# Patient Record
Sex: MALE | Race: WHITE | Employment: OTHER | URBAN - METROPOLITAN AREA
[De-identification: names, ages, dates, MRNs, and addresses within clinical notes are randomized per-mention and may not be internally consistent; named-entity substitution may affect disease eponyms.]

---

## 2018-03-07 ENCOUNTER — HOSPITAL ENCOUNTER (OUTPATIENT)
Dept: LAB | Age: 73
Discharge: HOME | End: 2018-03-07
Payer: MEDICARE

## 2018-03-07 ENCOUNTER — OFFICE VISIT (OUTPATIENT)
Dept: INTERNAL MEDICINE CLINIC | Age: 73
End: 2018-03-07

## 2018-03-07 ENCOUNTER — TELEPHONE (OUTPATIENT)
Dept: INTERNAL MEDICINE CLINIC | Age: 73
End: 2018-03-07

## 2018-03-07 VITALS
WEIGHT: 193.4 LBS | BODY MASS INDEX: 22.83 KG/M2 | HEIGHT: 77 IN | RESPIRATION RATE: 16 BRPM | OXYGEN SATURATION: 97 % | SYSTOLIC BLOOD PRESSURE: 142 MMHG | TEMPERATURE: 98.7 F | DIASTOLIC BLOOD PRESSURE: 90 MMHG | HEART RATE: 77 BPM

## 2018-03-07 DIAGNOSIS — Z13.39 SCREENING FOR ALCOHOLISM: ICD-10-CM

## 2018-03-07 DIAGNOSIS — Z12.11 COLON CANCER SCREENING: Chronic | ICD-10-CM

## 2018-03-07 DIAGNOSIS — Z12.5 PROSTATE CANCER SCREENING: ICD-10-CM

## 2018-03-07 DIAGNOSIS — N52.9 ERECTILE DYSFUNCTION, UNSPECIFIED ERECTILE DYSFUNCTION TYPE: ICD-10-CM

## 2018-03-07 DIAGNOSIS — K64.9 HEMORRHOIDS, UNSPECIFIED HEMORRHOID TYPE: ICD-10-CM

## 2018-03-07 DIAGNOSIS — Z13.31 SCREENING FOR DEPRESSION: ICD-10-CM

## 2018-03-07 DIAGNOSIS — E78.2 MIXED HYPERLIPIDEMIA: ICD-10-CM

## 2018-03-07 DIAGNOSIS — Z79.899 ENCOUNTER FOR LONG-TERM (CURRENT) USE OF MEDICATIONS: ICD-10-CM

## 2018-03-07 DIAGNOSIS — Z00.00 INITIAL MEDICARE ANNUAL WELLNESS VISIT: Primary | ICD-10-CM

## 2018-03-07 PROCEDURE — 81003 URINALYSIS AUTO W/O SCOPE: CPT

## 2018-03-07 PROCEDURE — 36415 COLL VENOUS BLD VENIPUNCTURE: CPT

## 2018-03-07 PROCEDURE — 80061 LIPID PANEL: CPT

## 2018-03-07 PROCEDURE — 84153 ASSAY OF PSA TOTAL: CPT

## 2018-03-07 PROCEDURE — 85025 COMPLETE CBC W/AUTO DIFF WBC: CPT

## 2018-03-07 PROCEDURE — 80053 COMPREHEN METABOLIC PANEL: CPT

## 2018-03-07 NOTE — MR AVS SNAPSHOT
7 [facility], [address] [place] [address] 
[phone] Patient: Whit Dotsno MRN: [id number] PMY:[birth year] Visit Information Date & Time Provider Department Dept. Phone Encounter #  
 3/7/2018 11:20 AM Le Solis MD [place] [id number] [facility] [id number] Follow-up Instructions Return in about 1 year (around 3/7/2019). Upcoming Health Maintenance Date Due  
 GLAUCOMA SCREENING Q2Y 5/1/2018* Pneumococcal 65+ Low/Medium Risk (2 of 2 - PPSV23) 5/1/2018* DTaP/Tdap/Td series (1 - Tdap) 6/1/2018* MEDICARE YEARLY EXAM 3/8/2019 COLONOSCOPY 3/7/2028 *Topic was postponed. The date shown is not the original due date. Allergies as of 3/7/2018  Review Complete On: 3/7/2018 By: Le Solis MD  
  
 Severity Noted Reaction Type Reactions Amoxicillin High 12/15/2010    Hives Other Medication  02/15/2013    Other (comments) PT HAD HIVES FOR 10 MONTHS AFTER ING HR WITH MESH; PT SUSPECTS MESH; HIVES MOSTLY ON ABDOMEN BUT ON LIMPBS & FACE AT TIMES. Current Immunizations  Reviewed on 3/7/2018 Name Date Influenza High Dose Vaccine PF 12/15/2017, 11/29/2016, 1/5/2016 Influenza Vaccine 10/30/2014 Influenza Vaccine Whole 10/7/2010 ZZZ-RETIRED (DO NOT USE) Pneumococcal Vaccine (Unspecified Type) 1/1/2009 Zoster Vaccine, Live 4/20/2013 Reviewed by Le Solis MD on 3/7/2018 at 11:37 AM  
 Reviewed by Le Solis MD on 3/7/2018 at 11:37 AM  
 Reviewed by Le Solis MD on 3/7/2018 at 11:43 AM  
 Reviewed by Le Solis MD on 3/7/2018 at 11:48 AM  
 Reviewed by Le Solis MD on 3/7/2018 at 11:48 AM  
 Reviewed by Le Solis MD on 3/7/2018 at 11:51 AM  
You Were Diagnosed With   
  
 Codes Comments Erectile dysfunction, unspecified erectile dysfunction type    -  Primary ICD-10-CM: N52.9 ICD-9-CM: 607.84 Encounter for long-term (current) use of medications     ICD-10-CM: Z79.899 ICD-9-CM: V58.69 Colon cancer screening     ICD-10-CM: Z12.11 ICD-9-CM: V76.51 Initial Medicare annual wellness visit     ICD-10-CM: Z00.00 ICD-9-CM: V70.0 Screening for alcoholism     ICD-10-CM: Z13.89 ICD-9-CM: V79.1 Screening for depression     ICD-10-CM: Z13.89 ICD-9-CM: V79.0 Vitals BP Pulse Temp Resp Height(growth percentile) Weight(growth percentile) 142/90 (BP 1 Location: Left arm, BP Patient Position: Sitting) 77 98.7 °F (37.1 °C) (Oral) 16 6' 5\" (1.956 m) 193 lb 6.4 oz (87.7 kg) SpO2 BMI Smoking Status 97% 22.93 kg/m2 Never Smoker BMI and BSA Data Body Mass Index Body Surface Area  
 22.93 kg/m 2 2.18 m 2 Preferred Pharmacy Pharmacy Name Phone [place] [address] [address] [facility] [phone] Your Updated Medication List  
  
   
This list is accurate as of 3/7/18 12:12 PM.  Always use your most recent med list.  
  
  
  
  
 DESOXIMETASONE  
by Does Not Apply route. diph,pertuss(acel),tetanus vac(PF) 2 Lf-(2.5-5-3-5 mcg)-5Lf/0.5 mL Syrg vaccine Commonly known as:  ADACEL(TDAP ADOLESN/ADULT)(PF)  
0.5 mL by IntraMUSCular route once for 1 dose. pneumococcal 13 paco conj dip 0.5 mL Syrg injection Commonly known as:  PREVNAR-13  
0.5 mL by IntraMUSCular route once for 1 dose. sildenafil (antihypertensive) 20 mg tablet Commonly known as:  REVATIO Take one tablet daily as needed  
  
 triamcinolone acetonide 0.025 % topical cream  
Commonly known as:  KENALOG Apply  to affected area two (2) times a day. use thin layer  
  
 varicella-zoster recombinant (PF) 50 mcg/0.5 mL Susr injection Commonly known as:  SHINGRIX  
0.5 mL by IntraMUSCular route once for 1 dose. Repeat in 2-4 months Prescriptions Printed Refills  diph,pertuss,acel,,tetanus vac,PF, (ADACEL,TDAP ADOLESN/ADULT,,PF,) 2 Lf-(2.5-5-3-5 mcg)-5Lf/0.5 mL syrg vaccine 0  
 Si.5 mL by IntraMUSCular route once for 1 dose. Class: Print Route: IntraMUSCular  
 pneumococcal 13 paco conj dip (PREVNAR-13) 0.5 mL syrg injection 0 Si.5 mL by IntraMUSCular route once for 1 dose. Class: Print Route: IntraMUSCular  
 varicella-zoster recombinant, PF, (SHINGRIX) 50 mcg/0.5 mL susr injection 1 Si.5 mL by IntraMUSCular route once for 1 dose. Repeat in 2-4 months Class: Print Route: IntraMUSCular We Performed the Following [address] [[id number] [facility]] DE ANNUAL ALCOHOL SCREEN 15 MIN [id number] [facility]] Follow-up Instructions Return in about 1 year (around 3/7/2019). Patient Instructions Medicare Wellness Visit, Male The best way to live healthy is to have a healthy lifestyle by eating a well-balanced diet, exercising regularly, limiting alcohol and stopping smoking. Regular physical exams and screening tests are another way to keep healthy. Preventive exams provided by your health care provider can find health problems before they become diseases or illnesses. Preventive services including immunizations, screening tests, monitoring and exams can help you take care of your own health. All people over age 72 should have a pneumovax  and and a prevnar shot to prevent pneumonia. These are once in a lifetime unless you and your provider decide differently. All people over 65 should have a yearly flu shot and a tetanus vaccine every 10 years. Screening for diabetes mellitus with a blood sugar test should be done every year. Glaucoma is a disease of the eye due to increased ocular pressure that can lead to blindness and it should be done every year by an eye professional. 
 
Cardiovascular screening tests that check for elevated lipids (fatty part of blood) which can lead to heart disease and strokes should be done every 5 years.  
 
Colorectal screening that evaluates for blood or polyps in your colon should be done yearly as a stool test or every five years as a flexible sigmoidoscope or every 10 years as a colonoscopy up to age 76. Men up to age 76 may need a screening blood test for prostate cancer at certain intervals, depending on their personal and family history. This decision is between the patient and his provider. If you have been a smoker or had family history of abdominal aortic aneurysms, you and your provider may decide to schedule an ultrasound test of your aorta. Hepatitis C screening is also recommended for anyone born between [birth year] through [address]. A shingles vaccine is also recommended once in a lifetime after age 61. Your Medicare Wellness Exam is recommended annually. Here is a list of your current Health Maintenance items with a due date: There are no preventive care reminders to display for this patient. Introducing [facility]! Dear Citlaly Cramer: Thank you for requesting a [employer] account. Our records indicate that you already have an active Dat[employer] account. You can access your account anytime at [email or web address] Did you know that you can access your hospital and ER discharge instructions at any time in Dat[employer]? You can also review all of your test results from your hospital stay or ER visit. Additional Information If you have questions, please visit the Frequently Asked Questions section of the Dat[employer] website at [email or web address] Remember, [employer] is NOT to be used for urgent needs. For medical emergencies, dial 911. Now available from your iPhone and Android! Please provide this summary of care documentation to your next provider. Your primary care clinician is listed as Re Cheung. If you have any questions after today's visit, please call [phone].

## 2018-03-07 NOTE — TELEPHONE ENCOUNTER
Fax sent to [place] [phone] requesting immunization/vaccine record, including pneumonia. Confirmation received and scanned into chart.

## 2018-03-07 NOTE — TELEPHONE ENCOUNTER
----- Message from Rand Hwang MD sent at 3/7/2018 11:54 AM EST -----  Please get his immunization record from the [address] [place] [address] for pneumonia vaccines.      Rand Hwang MD

## 2018-03-07 NOTE — PROGRESS NOTES
Subjective:      Bart Obrien is a 74 y.o. male who presents today for refills of his viagra. He has not been seen since 2016. No new concerns. He declines to have a screening colonoscopy done  He is due for his eye exam at this time. He is due for several immunizations. He also has concerns about a recurrent hemorrhoid. He had a little bit of blood with wiping this morning. No pain and no recurrence. Patient Active Problem List   Diagnosis Code    Inguinal hernia K40.90    Colon cancer screening Z12.11    Hemorrhoids K64.9    Eczema L30.9    Vitiligo L80     Current Outpatient Prescriptions   Medication Sig Dispense Refill    DESOXIMETASONE by Does Not Apply route.  sildenafil (REVATIO) 20 mg tablet Take one tablet daily as needed 30 Tab 3    triamcinolone acetonide (KENALOG) 0.025 % topical cream Apply  to affected area two (2) times a day. use thin layer 15 g 0        Review of Systems    Pertinent items are noted in HPI. Objective:     Visit Vitals    /90 (BP 1 Location: Left arm, BP Patient Position: Sitting)    Pulse 77    Temp 98.7 °F (37.1 °C) (Oral)    Resp 16    Ht 6' 5\" (1.956 m)    Wt 193 lb 6.4 oz (87.7 kg)    SpO2 97%    BMI 22.93 kg/m2     General appearance: alert, cooperative, no distress, appears stated age  Head: Normocephalic, without obvious abnormality, atraumatic  Neck: supple, symmetrical, trachea midline, no adenopathy, no carotid bruit and no JVD  Lungs: clear to auscultation bilaterally  Heart: regular rate and rhythm, S1, S2 normal, no murmur, click, rub or gallop  Abdomen: soft, non-tender. Bowel sounds normal. No masses,  no organomegaly  Rectal: external hemorrhoids noted. No full nor bleeding  Extremities: extremities normal, atraumatic, no cyanosis or edema  Pulses: 2+ and symmetric    Assessment/Plan:     1.  Erectile dysfunction, unspecified erectile dysfunction type  -will check labs, including PSA prior to refilling his medications    2. Encounter for long-term (current) use of medications      3. Colon cancer screening  -patient states aware of importance of a screening colonoscopy . He continues to refuse testing. 4. Initial Medicare annual wellness visit  -done today  -scripts for tdap, prevnar and also shingrix given to patient. - Annual  Alcohol Screen 15 min ()  - Depression Screen Annual    5. Screening for alcoholism    - Annual  Alcohol Screen 15 min ()    6. Screening for depression    - Depression Screen Annual    7. Prostate cancer screening    - PSA SCREENING (SCREENING)    8. Mixed hyperlipidemia  -diet controlled. - CBC WITH AUTOMATED DIFF  - METABOLIC PANEL, COMPREHENSIVE  - LIPID PANEL  - URINALYSIS W/ RFLX MICROSCOPIC    9. Hemorrhoids, unspecified hemorrhoid type  -continue to monitor     Follow-up Disposition:     Follow up yearly     Return if symptoms worsen or fail to improve. Advised patient to call back or return to office if symptoms worsen/change/persist.     Discussed expected course/resolution/complications of diagnosis in detail with patient. Medication risks/benefits/costs/interactions/alternatives discussed with patient. Patient was given an after visit summary which includes diagnoses, current medications, & vitals. Patient expressed understanding with the diagnosis and plan. This is an Initial Medicare Annual Wellness Exam (AWV) (Performed 12 months after IPPE or effective date of Medicare Part B enrollment, Once in a lifetime)    I have reviewed the patient's medical history in detail and updated the computerized patient record.      History     Past Medical History:   Diagnosis Date    Hearing reduced     Hemorrhoids     Ill-defined condition     IDIOPATHIC HIVES    Recurrent left inguinal hernia 2/10/2013    repaired 2/21/13    Recurrent right inguinal hernia 8/20/2014    Snoring     Stroke ([facility] [address] 10/2014    POSSIBLE TIA; MRI DONE TODAY;    Vertigo  Vitiligo       Past Surgical History:   Procedure Laterality Date    HX CHOLECYSTECTOMY  june 1982     Dr. Lokesh Mak HX HEMORRHOIDECTOMY      HX HERNIA REPAIR  11-    Dr. Andrew Felipe    HX HERNIA REPAIR  12-28-10    Left inguinal indirect hernia and lipoma of cord-Dr. Joesph Castaneda    HX HERNIA REPAIR  2-21-13    repair of recurrent left inguinal nktsax-DKG-OrDr. Vladimir Clark    HX HERNIA REPAIR  12-30-14    Excision of suture granuloma right inguinal region and repair of recurrent direct inguinal hernia with P&P-[facility]-DR. Vladimir Clark    HX ORTHOPAEDIC  1955    COMPUND FX LEFT FOREARM W/PIN AND SCREW    HX OTHER SURGICAL      VASECTOMY    REMOVAL OF SPERM CORD LESION  12-28-10    Dr. Amaya Breezjustyn HERNIA,5+Y/O,REDUCIBL  12-    Dr. Peter Connelly by Does Not Apply route.  sildenafil (REVATIO) 20 mg tablet Take one tablet daily as needed 30 Tab 3    triamcinolone acetonide (KENALOG) 0.025 % topical cream Apply  to affected area two (2) times a day. use thin layer 15 g 0     Allergies   Allergen Reactions    Amoxicillin Hives    Other Medication Other (comments)     PT HAD HIVES FOR 10 MONTHS AFTER ING HR WITH MESH; PT SUSPECTS MESH; HIVES MOSTLY ON ABDOMEN BUT ON LIMPBS & FACE AT TIMES.        Family History   Problem Relation Age of Onset   [facility] Stroke Mother      aneurysm - hemorrhagic stroke    Hypertension Father     Stroke Father      started age [de-identified]    Hypertension Sister     Cancer Sister      Bone    Cancer Daughter      MELANOMA    Anesth Problems Neg Hx      Social History   Substance Use Topics    Smoking status: Never Smoker    Smokeless tobacco: Never Used    Alcohol use Yes      Comment: RARELY     Patient Active Problem List   Diagnosis Code    Inguinal hernia K40.90    Colon cancer screening Z12.11    Hemorrhoids K64.9    Eczema L30.9    Vitiligo L80       Depression Risk Factor Screening:     PHQ over the last two weeks 3/7/2018   Little interest or pleasure in doing things Not at all   Feeling down, depressed or hopeless Not at all   Total Score PHQ 2 0     Alcohol Risk Factor Screening: You do not drink alcohol or very rarely. Functional Ability and Level of Safety:     Hearing Loss  Hearing is good. The patient wears hearing aids. Activities of Daily Living  The home contains: no safety equipment. Patient does total self care    Fall Risk  Fall Risk Assessment, last 12 mths 3/7/2018   Able to walk? Yes   Fall in past 12 months? Yes   Fall with injury? No   Number of falls in past 12 months 1   Fall Risk Score 1       Abuse Screen  Patient is not abused    Cognitive Screening   Evaluation of Cognitive Function:  Has your family/caregiver stated any concerns about your memory: no  Normal    Patient Care Team   Patient Care Team:  Rand Hwang MD as PCP - General (Internal Medicine)    Assessment/Plan   Education and counseling provided:  Are appropriate based on today's review and evaluation    Diagnoses and all orders for this visit:    1. Erectile dysfunction, unspecified erectile dysfunction type    2. Encounter for long-term (current) use of medications    3. Colon cancer screening    4. Initial Medicare annual wellness visit  -     Annual  Alcohol Screen 15 min ()  -     Depression Screen Annual    5. Screening for alcoholism  -     Annual  Alcohol Screen 15 min ()    6. Screening for depression  -     Depression Screen Annual         There are no preventive care reminders to display for this patient.

## 2018-03-07 NOTE — PROGRESS NOTES
Yolanda Marks is a 74 y.o. male      Chief Complaint   Patient presents with    Medication Evaluation     states want Sildenafil Citrate    Immunization/Injection     wants a Tentanus shot because a dog scratch has right arm about 2 weeks ago.  Anal Irritation     states wants his rectum check because he's having pain in that area     1. Have you been to the ER, urgent care clinic since your last visit? Hospitalized since your last visit? No    2. Have you seen or consulted any other health care providers outside of the [address] [place] [address] since your last visit? Include any pap smears or colon screening.  Yes, went to see Dr. Jose Krause for skin lesion and frozen off (derm)

## 2018-03-08 DIAGNOSIS — N52.9 ERECTILE DYSFUNCTION, UNSPECIFIED ERECTILE DYSFUNCTION TYPE: ICD-10-CM

## 2018-03-08 LAB
ALBUMIN SERPL-MCNC: 4.1 G/DL (ref 3.5–4.8)
ALBUMIN/GLOB SERPL: 1.5 {RATIO} (ref 1.2–2.2)
ALP SERPL-CCNC: 77 IU/L (ref 39–117)
ALT SERPL-CCNC: 17 IU/L (ref 0–44)
APPEARANCE UR: CLEAR
AST SERPL-CCNC: 23 IU/L (ref 0–40)
BASOPHILS # BLD AUTO: 0 X10E3/UL (ref 0–0.2)
BASOPHILS NFR BLD AUTO: 1 %
BILIRUB SERPL-MCNC: 0.6 MG/DL (ref 0–1.2)
BILIRUB UR QL STRIP: NEGATIVE
BUN SERPL-MCNC: 15 MG/DL (ref 8–27)
BUN/CREAT SERPL: 14 (ref 10–24)
CALCIUM SERPL-MCNC: 9.6 MG/DL (ref 8.6–10.2)
CHLORIDE SERPL-SCNC: 97 MMOL/L (ref 96–106)
CHOLEST SERPL-MCNC: 199 MG/DL (ref 100–199)
CO2 SERPL-SCNC: 27 MMOL/L (ref 18–29)
COLOR UR: YELLOW
CREAT SERPL-MCNC: 1.08 MG/DL (ref 0.76–1.27)
EOSINOPHIL # BLD AUTO: 0.2 X10E3/UL (ref 0–0.4)
EOSINOPHIL NFR BLD AUTO: 4 %
ERYTHROCYTE [DISTWIDTH] IN BLOOD BY AUTOMATED COUNT: 13.1 % (ref 12.3–15.4)
GFR SERPLBLD CREATININE-BSD FMLA CKD-EPI: 67 ML/MIN/1.73
GFR SERPLBLD CREATININE-BSD FMLA CKD-EPI: 78 ML/MIN/1.73
GLOBULIN SER CALC-MCNC: 2.8 G/DL (ref 1.5–4.5)
GLUCOSE SERPL-MCNC: 73 MG/DL (ref 65–99)
GLUCOSE UR QL: NEGATIVE
HCT VFR BLD AUTO: 46.5 % (ref 37.5–51)
HDLC SERPL-MCNC: 58 MG/DL
HGB BLD-MCNC: 15.8 G/DL (ref 13–17.7)
HGB UR QL STRIP: NEGATIVE
IMM GRANULOCYTES # BLD: 0 X10E3/UL (ref 0–0.1)
IMM GRANULOCYTES NFR BLD: 0 %
INTERPRETATION, 910389: NORMAL
KETONES UR QL STRIP: NEGATIVE
LDLC SERPL CALC-MCNC: 117 MG/DL (ref 0–99)
LEUKOCYTE ESTERASE UR QL STRIP: NEGATIVE
LYMPHOCYTES # BLD AUTO: 1.6 X10E3/UL (ref 0.7–3.1)
LYMPHOCYTES NFR BLD AUTO: 30 %
MCH RBC QN AUTO: 32 PG (ref 26.6–33)
MCHC RBC AUTO-ENTMCNC: 34 G/DL (ref 31.5–35.7)
MCV RBC AUTO: 94 FL (ref 79–97)
MICRO URNS: NORMAL
MONOCYTES # BLD AUTO: 0.6 X10E3/UL (ref 0.1–0.9)
MONOCYTES NFR BLD AUTO: 10 %
NEUTROPHILS # BLD AUTO: 3 X10E3/UL (ref 1.4–7)
NEUTROPHILS NFR BLD AUTO: 55 %
NITRITE UR QL STRIP: NEGATIVE
PH UR STRIP: 5 [PH] (ref 5–7.5)
PLATELET # BLD AUTO: 238 X10E3/UL (ref 150–379)
POTASSIUM SERPL-SCNC: 5 MMOL/L (ref 3.5–5.2)
PROT SERPL-MCNC: 6.9 G/DL (ref 6–8.5)
PROT UR QL STRIP: NEGATIVE
PSA SERPL-MCNC: 1.2 NG/ML (ref 0–4)
RBC # BLD AUTO: 4.93 X10E6/UL (ref 4.14–5.8)
SODIUM SERPL-SCNC: 140 MMOL/L (ref 134–144)
SP GR UR: 1.01 (ref 1–1.03)
TRIGL SERPL-MCNC: 119 MG/DL (ref 0–149)
UROBILINOGEN UR STRIP-MCNC: 0.2 MG/DL (ref 0.2–1)
VLDLC SERPL CALC-MCNC: 24 MG/DL (ref 5–40)
WBC # BLD AUTO: 5.5 X10E3/UL (ref 3.4–10.8)

## 2018-03-08 RX ORDER — SILDENAFIL CITRATE 20 MG/1
TABLET ORAL
Qty: 30 TAB | Refills: 3 | Status: SHIPPED | OUTPATIENT
Start: 2018-03-08 | End: 2019-04-01 | Stop reason: SDUPTHER

## 2018-03-08 NOTE — TELEPHONE ENCOUNTER
Received fax from [facility] regarding patient. Note that there are \"NO RECORDS\" for this patient.   Forwarded to Dr. Margaux Amin.

## 2018-08-31 ENCOUNTER — TELEPHONE (OUTPATIENT)
Dept: INTERNAL MEDICINE CLINIC | Age: 73
End: 2018-08-31

## 2018-09-04 ENCOUNTER — DOCUMENTATION ONLY (OUTPATIENT)
Dept: INTERNAL MEDICINE CLINIC | Age: 73
End: 2018-09-04

## 2018-09-04 NOTE — PROGRESS NOTES
Received fax from [facility] with documentation of administration of immunizations, including: Shingrix. Chart updated to reflect, and placed to be scanned into chart.

## 2018-09-26 ENCOUNTER — OFFICE VISIT (OUTPATIENT)
Dept: INTERNAL MEDICINE CLINIC | Age: 73
End: 2018-09-26

## 2018-09-26 VITALS
TEMPERATURE: 96.4 F | SYSTOLIC BLOOD PRESSURE: 130 MMHG | HEART RATE: 88 BPM | WEIGHT: 193 LBS | HEIGHT: 77 IN | RESPIRATION RATE: 18 BRPM | OXYGEN SATURATION: 94 % | DIASTOLIC BLOOD PRESSURE: 88 MMHG | BODY MASS INDEX: 22.79 KG/M2

## 2018-09-26 DIAGNOSIS — Z78.9 RECENT FOREIGN TRAVEL: ICD-10-CM

## 2018-09-26 DIAGNOSIS — R22.0 FACIAL SWELLING: Primary | ICD-10-CM

## 2018-09-26 DIAGNOSIS — R05.9 COUGH: ICD-10-CM

## 2018-09-26 RX ORDER — AZITHROMYCIN 250 MG/1
250 TABLET, FILM COATED ORAL SEE ADMIN INSTRUCTIONS
Qty: 6 TAB | Refills: 0 | Status: SHIPPED | OUTPATIENT
Start: 2018-09-26 | End: 2018-10-01

## 2018-09-26 RX ORDER — BETAMETHASONE DIPROPIONATE 0.5 MG/G
CREAM TOPICAL
COMMUNITY
Start: 2018-09-14 | End: 2019-04-03

## 2018-09-26 NOTE — PATIENT INSTRUCTIONS
1. Mucinex (Guaifenesen) plain-Blue Box 600 mg. Take one twice daily with full glass water   Take for 10 days    2. Saline Nasal Spray - use liberally to flush post-nasal area; use as many times a day as desired. Keep spraying with head tilted back until you feel need to swallow    3. Drink lots of fluids (mainly water) to keep mucus thinner    4. If needed, for cough, we recommend Delsym cough syrup    5. Long acting antihistamine (Allegra/Fexofenadine or Zyrtec/Ceterizine) is useful if allergy symptoms are also present    6. Decongestants should not be used they actually contribute to overdrying/thickening of the mucus, and can raise the BP and overstimulate the heart    7.  Cool compress to facial swelling as needed for 20 minutes per application

## 2018-09-26 NOTE — PROGRESS NOTES
Reviewed record in preparation for visit and have obtained necessary documentation. Identified pt with two pt identifiers(name and ). Health Maintenance Due   Topic    DTaP/Tdap/Td series (1 - Tdap)    Pneumococcal 65+ Low/Medium Risk (2 of 2 - PPSV23)    GLAUCOMA SCREENING Q2Y     Influenza Age 5 to Adult          Chief Complaint   Patient presents with    Allergic Reaction     Patient stated it stated with runny nose coming from Vac[place] on plane on Monday. Tuesday he noted lips stated swelling  after lunch.  Cold Symptoms     Patient stated no pain or itching to lips. Patient stated took Bendryl but it has not help also noted coughing but no coughing today. Wt Readings from Last 3 Encounters:   18 193 lb (87.5 kg)   18 193 lb 6.4 oz (87.7 kg)   16 187 lb 8 oz (85 kg)     Temp Readings from Last 3 Encounters:   18 98.7 °F (37.1 °C) (Oral)   16 98 °F (36.7 °C) (Oral)   04/08/15 98.6 °F (37 °C) (Oral)     BP Readings from Last 3 Encounters:   18 142/90   16 130/80   04/08/15 130/82     Pulse Readings from Last 3 Encounters:   18 77   16 93   04/08/15 90           Learning Assessment:  :     Learning Assessment 2014   PRIMARY LEARNER Patient   HIGHEST LEVEL OF EDUCATION - PRIMARY LEARNER  > 4 YEARS OF COLLEGE   BARRIERS PRIMARY LEARNER NONE   CO-LEARNER CAREGIVER No   PRIMARY LANGUAGE ENGLISH    NEED No   LEARNER PREFERENCE PRIMARY READING     DEMONSTRATION   LEARNING SPECIAL TOPICS no   ANSWERED BY patient   RELATIONSHIP SELF       Depression Screening:  :     PHQ over the last two weeks 2018   Little interest or pleasure in doing things Not at all   Feeling down, depressed, irritable, or hopeless Not at all   Total Score PHQ 2 0       Fall Risk Assessment:  :     Fall Risk Assessment, last 12 mths 2018   Able to walk? Yes   Fall in past 12 months?  No   Fall with injury? -   Number of falls in past 12 months - Fall Risk Score -       Abuse Screening:  :     Abuse Screening Questionnaire 3/7/2018 7/1/2014   Do you ever feel afraid of your partner? N N   Are you in a relationship with someone who physically or mentally threatens you? N N   Is it safe for you to go home? Y Y       Coordination of Care Questionnaire:  :     1) Have you been to an emergency room, urgent care clinic since your last visit? no   Hospitalized since your last visit? no             2) Have you seen or consulted any other health care providers outside of [facility] since your last visit? yes Dr. Tran Last  8/11/2018(Include any pap smears or colon screenings in this section.)    3) Do you have an Advance Directive on file? yes    4) Are you interested in receiving information on Advance Directives? NO      Patient is accompanied by self I have received verbal consent from Neo Chamberlain to discuss any/all medical information while they are present in the room.

## 2018-09-26 NOTE — PROGRESS NOTES
75 yo male arrived back from [facility] late on PM of 9/23. His nose dripped all the way back, and now cough. He took an OTC \"plus\" cold med, and was able to sleep. Yesterday AM, he had a swollen area on upper R lip was was \"hard\", and he developed a cough. He has had hives in the past, but they always start with itching followed by hives. He has found this process can be stopped with oral Benedryl, so he he has taken several doses of this, but as the day went on the swelling spreac across across the entire upper lip and that area of the face. There has been no tongue swelling or shortness of breath. He has had no hx of food allergies. He had his second Shingrix shot on 9/30. He sees a Dermatologist for [place] my itchy bumps\", and a very expensive medication was prescribed, but it is too expensive for him, so he decided not to go that direction. None of the topical agents he has used have helped. PE: WNWD WM   T - 96.4   Face - upper lip and area between lip and nose is visibly swollen; mild skin redness upper R lip and skin above   Heart - 88/M   Lungs - clear    Imp: Facial swelling - possible reaction to OTC med   Recent Foreign Travel   Cough    Plan: (discussed with Dr. Kymberly Gonzalez)   Z-pack   Hydration   Mucinex  ________________________  Expected course of current diagnosed problem(s) as well as expected progression and possible complications, and desired follow up with provider are discussed with patient. Patient is encouraged to be back in touch with any questions or concerns. Patient expresses understanding of plan of care. Patient is given AVS which includes diagnoses, current medications, vitals.

## 2018-09-26 NOTE — MR AVS SNAPSHOT
[address] [facility], [address] [place] [address] 
[phone] Patient: Sydney Adamson MRN: [id number] ADC:[id number][birth year][id number] Visit Information Date & Time Provider Department Dept. Phone Encounter #  
 9/26/2018  9:40 [facility] [phone] [id number] Upcoming Health Maintenance Date Due DTaP/Tdap/Td series (1 - Tdap) 6[birth year]5/1966 Pneumococcal 65+ Low/Medium Risk (2 of 2 - PPSV23) 1/1/2014 GLAUCOMA SCREENING Q2Y 1/1/2018 Influenza Age 5 to Adult 8/1/2018 Shingrix Vaccine Age 50> (2 of 2) 10/25/2018 MEDICARE YEARLY EXAM 3/8/2019 COLONOSCOPY 3/7/2028 Allergies as of 9/26/2018  Review Complete On: 9/26/2018 By: Yissel Emerson NP Severity Noted Reaction Type Reactions Amoxicillin High 12/15/2010    Hives Other Medication  02/15/2013    Other (comments) PT HAD HIVES FOR 10 MONTHS AFTER ING HR WITH MESH; PT SUSPECTS MESH; HIVES MOSTLY ON ABDOMEN BUT ON LIMPBS & FACE AT TIMES. Current Immunizations  Reviewed on 3/7/2018 Name Date Influenza High Dose Vaccine PF 12/15/2017, 11/29/2016, 1/5/2016 Influenza Vaccine 10/30/2014 Influenza Vaccine Whole 10/7/2010 ZZZ-RETIRED (DO NOT USE) Pneumococcal Vaccine (Unspecified Type) 1/1/2009 Zoster Recombinant 8/30/2018 Zoster Vaccine, Live 4/20/2013 Not reviewed this visit You Were Diagnosed With   
  
 Codes Comments Facial swelling    -  Primary ICD-10-CM: R22.0 ICD-9-CM: 784.2 Cough     ICD-10-CM: R05 ICD-9-CM: 786.2 Recent foreign travel     ICD-10-CM: Z78.9 ICD-9-CM: V49.89 Vitals BP Pulse Temp Resp Height(growth percentile) Weight(growth percentile) 130/88 (BP 1 Location: Left arm, BP Patient Position: Sitting) 88 96.4 °F (35.8 °C) (Oral) 18 6' 5\" (1.956 m) 193 lb (87.5 kg) SpO2 BMI Smoking Status 94% 22.89 kg/m2 Never Smoker Vitals History BMI and BSA Data Body Mass Index Body Surface Area  
 22.89 kg/m 2 2.18 m 2 Preferred Pharmacy Pharmacy Name Phone [facility] [address] [place] [address] W Dr. Hillary Han [facility] [phone] Your Updated Medication List  
  
   
This list is accurate as of 9/26/18 10:58 AM.  Always use your most recent med list.  
  
  
  
  
 augmented betamethasone dipropionate 0.05 % topical cream  
Commonly known as:  DIPROLENE-AF  
  
 azithromycin 250 mg tablet Commonly known as:  [place] Jazmine Take 1 Tab by mouth See Admin Instructions for 5 days. DESOXIMETASONE  
by Does Not Apply route. sildenafil (antihypertensive) 20 mg tablet Commonly known as:  REVATIO  
3 tab daily 20 minutes prior to anticipated need. triamcinolone acetonide 0.025 % topical cream  
Commonly known as:  KENALOG Apply  to affected area two (2) times a day. use thin layer Prescriptions Sent to Pharmacy Refills  
 azithromycin (ZITHROMAX) 250 mg tablet 0 Sig: Take 1 Tab by mouth See Admin Instructions for 5 days. Class: Normal  
 Pharmacy: [facility] [address] [place] [address] W Dr. Hillary Han [facility] Ph #: [phone] Route: Oral  
  
Patient Instructions 1. Mucinex (Guaifenesen) plain-Blue Box 600 mg. Take one twice daily with full glass water Take for 10 days 2. Saline Nasal Spray - use liberally to flush post-nasal area; use as many times a day as desired. Keep spraying with head tilted back until you feel need to swallow 3. Drink lots of fluids (mainly water) to keep mucus thinner 4. If needed, for cough, we recommend Delsym cough syrup 5. Long acting antihistamine (Allegra/Fexofenadine or Zyrtec/Ceterizine) is useful if allergy symptoms are also present 6. Decongestants should not be used they actually contribute to overdrying/thickening of the mucus, and can raise the BP and overstimulate the heart 7. Cool compress to facial swelling as needed for 20 minutes per application Introducing [facility]! Dear Meagan Ray: Thank you for requesting a [employer] account. Our records indicate that you already have an active [employer] account. You can access your account anytime at [email or web address] Did you know that you can access your hospital and ER discharge instructions at any time in [employer]? You can also review all of your test results from your hospital stay or ER visit. Additional Information If you have questions, please visit the Frequently Asked Questions section of the [employer] website at [email or web address] Remember, [employer] is NOT to be used for urgent needs. For medical emergencies, dial 911. Now available from your iPhone and Android! Please provide this summary of care documentation to your next provider. Your primary care clinician is listed as Re Cheung. If you have any questions after today's visit, please call [phone].

## 2019-04-01 DIAGNOSIS — N52.9 ERECTILE DYSFUNCTION, UNSPECIFIED ERECTILE DYSFUNCTION TYPE: ICD-10-CM

## 2019-04-01 NOTE — TELEPHONE ENCOUNTER
Last refill- 03.08.18  Last office visit - 9/26/2018  Next office visit - No future appointments. Pt notified via [facility] of need for appointment for further refills.         Requested Prescriptions     Pending Prescriptions Disp Refills    sildenafil, antihypertensive, (REVATIO) 20 mg tablet [Pharmacy Med Name: SILDENAFIL 20 MG TABLET] 30 Tab 2     Sig: TAKE THREE TABLETS BY MOUTH 20 MINUTES PRIOR TO ANTICIPATED NEED

## 2019-04-03 ENCOUNTER — OFFICE VISIT (OUTPATIENT)
Dept: INTERNAL MEDICINE CLINIC | Age: 74
End: 2019-04-03

## 2019-04-03 VITALS
SYSTOLIC BLOOD PRESSURE: 140 MMHG | WEIGHT: 194.4 LBS | DIASTOLIC BLOOD PRESSURE: 80 MMHG | TEMPERATURE: 96.8 F | BODY MASS INDEX: 24.17 KG/M2 | HEART RATE: 72 BPM | RESPIRATION RATE: 16 BRPM | HEIGHT: 75 IN | OXYGEN SATURATION: 97 %

## 2019-04-03 DIAGNOSIS — N52.9 ERECTILE DYSFUNCTION, UNSPECIFIED ERECTILE DYSFUNCTION TYPE: Primary | ICD-10-CM

## 2019-04-03 PROBLEM — N52.1 ERECTILE DYSFUNCTION DUE TO DISEASES CLASSIFIED ELSEWHERE: Status: ACTIVE | Noted: 2019-04-03

## 2019-04-03 RX ORDER — SILDENAFIL CITRATE 20 MG/1
TABLET ORAL
Qty: 30 TAB | Refills: 2 | Status: SHIPPED | OUTPATIENT
Start: 2019-04-03 | End: 2019-10-03 | Stop reason: ALTCHOICE

## 2019-04-03 NOTE — PROGRESS NOTES
Chief Complaint   Patient presents with    Medication Evaluation     Reviewed record in preparation for visit and have obtained necessary documentation. Identified pt with two pt identifiers(name and ). Health Maintenance Due   Topic    DTaP/Tdap/Td series (1 - Tdap)    Pneumococcal 65+ years (1 of 2 - PCV13)    GLAUCOMA SCREENING Q2Y     Shingrix Vaccine Age 49> (2 of 2)   [facility] [address]          Chief Complaint   Patient presents with    Medication Evaluation        Wt Readings from Last 3 Encounters:   19 194 lb 6.4 oz (88.2 kg)   18 193 lb (87.5 kg)   18 193 lb 6.4 oz (87.7 kg)     Temp Readings from Last 3 Encounters:   19 96.8 °F (36 °C) (Oral)   18 96.4 °F (35.8 °C) (Oral)   18 98.7 °F (37.1 °C) (Oral)     BP Readings from Last 3 Encounters:   19 140/80   18 130/88   18 142/90     Pulse Readings from Last 3 Encounters:   19 72   18 88   18 77           Learning Assessment:  :     Learning Assessment 4/3/2019 2014   PRIMARY LEARNER Patient Patient   HIGHEST LEVEL OF EDUCATION - PRIMARY LEARNER  > 4 YEARS OF COLLEGE > 4 YEARS OF COLLEGE   BARRIERS PRIMARY LEARNER - NONE   CO-LEARNER CAREGIVER - No   PRIMARY LANGUAGE ENGLISH ENGLISH    NEED - No   LEARNER PREFERENCE PRIMARY DEMONSTRATION READING     - DEMONSTRATION   LEARNING SPECIAL TOPICS - no   ANSWERED BY patient patient   RELATIONSHIP SELF SELF       Depression Screening:  :     3 most recent PHQ Screens 4/3/2019   Little interest or pleasure in doing things Not at all   Feeling down, depressed, irritable, or hopeless Not at all   Total Score PHQ 2 0       Fall Risk Assessment:  :     Fall Risk Assessment, last 12 mths 4/3/2019   Able to walk? Yes   Fall in past 12 months?  No   Fall with injury? -   Number of falls in past 12 months -   Fall Risk Score -       Abuse Screening:  :     Abuse Screening Questionnaire 4/3/2019 3/7/2018 2014   Do you ever feel afraid of your partner? N N N   Are you in a relationship with someone who physically or mentally threatens you? N N N   Is it safe for you to go home? Y Y Y       Coordination of Care Questionnaire:  :     1) Have you been to an emergency room, urgent care clinic since your last visit? no   Hospitalized since your last visit? no             2) Have you seen or consulted any other health care providers outside of [address] [place] [address] since your last visit? no  (Include any pap smears or colon screenings in this section.)    3) Do you have an Advance Directive on file? no    4) Are you interested in receiving information on Advance Directives? NO      Patient is accompanied by self I have received verbal consent from Brandy Lopes to discuss any/all medical information while they are present in the room. Reviewed record  In preparation for visit and have obtained necessary documentation.

## 2019-04-03 NOTE — PROGRESS NOTES
75 yo male in for f/u and rx for his Sidenafil for ED. Last visit to Dr. Celia Tripp was a year ago. He uses several skin creams for eczema, sees a Dermatologist, and had MOHS surgery on back of neck in Jan for squamous cell CA. He is physically active including regular physical exercise at the gym. He eats a nutritional diet. He has a hernia \"support\" device, and has had 4 hernia surgeries including 2 of each inguinal areas. He is careful about doing exertion which may bother that R side. He reports he considered entering the class action about the mesh he has, but he reports he has been told the \"records are gone\". He has never had a colonoscopy, and has no bowel issues. He has had both Shingrix immunizations in 2018. PE: WNWD WM is alert   BP - 140/80   Heart - RRR; no murmur   Lungs - clear     Imp: ED    Plan: He will schedule to see Dr. Celia Tripp in 6 mos for f/u and Medicare Wellness   Labs from March 2018 reviewed - all normal - so he wants to wait on repeat until he sees Dr. Celia Tripp   Sildenafil rx is refilled  ___________________________  Expected course of current diagnosed problem(s) as well as expected progression and possible complications, and desired follow up with provider are discussed with patient. Patient is encouraged to be back in touch with any questions or concerns. Patient expresses understanding of plan of care. Patient is given AVS which includes diagnoses, current medications, vitals.

## 2019-10-03 ENCOUNTER — HOSPITAL ENCOUNTER (OUTPATIENT)
Dept: LAB | Age: 74
Discharge: HOME | End: 2019-10-03
Payer: MEDICARE

## 2019-10-03 ENCOUNTER — OFFICE VISIT (OUTPATIENT)
Dept: INTERNAL MEDICINE CLINIC | Age: 74
End: 2019-10-03

## 2019-10-03 VITALS
OXYGEN SATURATION: 96 % | BODY MASS INDEX: 23.48 KG/M2 | TEMPERATURE: 98 F | RESPIRATION RATE: 15 BRPM | HEART RATE: 87 BPM | HEIGHT: 75 IN | SYSTOLIC BLOOD PRESSURE: 152 MMHG | WEIGHT: 188.8 LBS | DIASTOLIC BLOOD PRESSURE: 90 MMHG

## 2019-10-03 DIAGNOSIS — Z00.00 MEDICARE ANNUAL WELLNESS VISIT, SUBSEQUENT: Primary | ICD-10-CM

## 2019-10-03 DIAGNOSIS — N52.9 ERECTILE DYSFUNCTION, UNSPECIFIED ERECTILE DYSFUNCTION TYPE: ICD-10-CM

## 2019-10-03 DIAGNOSIS — E78.2 MIXED HYPERLIPIDEMIA: ICD-10-CM

## 2019-10-03 DIAGNOSIS — Z23 ENCOUNTER FOR IMMUNIZATION: ICD-10-CM

## 2019-10-03 DIAGNOSIS — Z13.31 SCREENING FOR DEPRESSION: ICD-10-CM

## 2019-10-03 DIAGNOSIS — Z13.39 SCREENING FOR ALCOHOLISM: ICD-10-CM

## 2019-10-03 DIAGNOSIS — Z12.5 PROSTATE CANCER SCREENING: ICD-10-CM

## 2019-10-03 PROCEDURE — 80053 COMPREHEN METABOLIC PANEL: CPT

## 2019-10-03 PROCEDURE — 85025 COMPLETE CBC W/AUTO DIFF WBC: CPT

## 2019-10-03 PROCEDURE — 84153 ASSAY OF PSA TOTAL: CPT

## 2019-10-03 PROCEDURE — 80061 LIPID PANEL: CPT

## 2019-10-03 RX ORDER — TADALAFIL 10 MG/1
TABLET ORAL
Qty: 10 TAB | Refills: 0 | Status: SHIPPED | OUTPATIENT
Start: 2019-10-03 | End: 2020-10-06

## 2019-10-03 NOTE — PROGRESS NOTES
Verified name and birth date for privacy precautions. Chart reviewed in preparation for today's visit. Chief Complaint   Patient presents with   Peter De Jesus Annual Wellness Visit     yearly. Health Maintenance Due   Topic    DTaP/Tdap/Td series (1 - Tdap)    Pneumococcal 65+ years (2 of 2 - PCV13)    GLAUCOMA SCREENING Q2Y     MEDICARE YEARLY EXAM     Influenza Age 5 to Adult          Wt Readings from Last 3 Encounters:   10/03/19 188 lb 12.8 oz (85.6 kg)   04/03/19 194 lb 6.4 oz (88.2 kg)   09/26/18 193 lb (87.5 kg)     Temp Readings from Last 3 Encounters:   10/03/19 98 °F (36.7 °C) (Oral)   04/03/19 96.8 °F (36 °C) (Oral)   09/26/18 96.4 °F (35.8 °C) (Oral)     BP Readings from Last 3 Encounters:   10/03/19 152/90   04/03/19 140/80   09/26/18 130/88     Pulse Readings from Last 3 Encounters:   10/03/19 87   04/03/19 72   09/26/18 88         Learning Assessment:  :     Learning Assessment 4/3/2019 7/1/2014   PRIMARY LEARNER Patient Patient   HIGHEST LEVEL OF EDUCATION - PRIMARY LEARNER  > 4 YEARS OF COLLEGE > 4 YEARS OF COLLEGE   BARRIERS PRIMARY LEARNER - NONE   CO-LEARNER CAREGIVER - No   PRIMARY LANGUAGE ENGLISH ENGLISH    NEED - No   LEARNER PREFERENCE PRIMARY DEMONSTRATION READING     - DEMONSTRATION   LEARNING SPECIAL TOPICS - no   ANSWERED BY patient patient   RELATIONSHIP SELF SELF       Depression Screening:  :     3 most recent PHQ Screens 10/3/2019   Little interest or pleasure in doing things Not at all   Feeling down, depressed, irritable, or hopeless Not at all   Total Score PHQ 2 0       Fall Risk Assessment:  :     Fall Risk Assessment, last 12 mths 10/3/2019   Able to walk? Yes   Fall in past 12 months? No   Fall with injury? -   Number of falls in past 12 months -   Fall Risk Score -       Abuse Screening:  :     Abuse Screening Questionnaire 4/3/2019 3/7/2018 7/1/2014   Do you ever feel afraid of your partner?  N N N   Are you in a relationship with someone who physically or mentally threatens you? N N N   Is it safe for you to go home? Maged Wiseman       Coordination of Care Questionnaire:  :     1) Have you been to an emergency room, urgent care clinic since your last visit? no   Hospitalized since your last visit? no             2) Have you seen or consulted any other health care providers outside of [address] [place] [address] since your last visit? yes  (Include any pap smears or colon screenings in this section.)    3) Do you have an Advance Directive on file? yes      Patient is currently accompanied by his self.   ------------------------------------------------    Sammie Koenig is a 66 y.o. male who presents for routine immunization per verbal order from Crispin Hill MD.  He denies any symptoms , reactions or allergies that would exclude them from being immunized today. Risks and adverse reactions were discussed and the VIS was given to him. All questions were addressed. He was observed for 10 min post injection. There were no reactions observed.

## 2019-10-03 NOTE — PROGRESS NOTES
This is the Subsequent Medicare Annual Wellness Exam, performed 12 months or more after the Initial AWV or the last Subsequent AWV    I have reviewed the patient's medical history in detail and updated the computerized patient record. History     Lavinia Howell is a 66 y.o. male who presents today for his Medicare Wellness Exam.     He follows with Dr. Pipe Lewis at [facility]. Had a area on his left arm that was removed because it was a squamous cell skin cancer. Patient refused colonoscopy     He is taking sildenafil 20mg 5 tablets prn for his ED. He states that this is not helping him much any more. He is still getting an erection nightly without use of any medications. He would like to try another med. Past Medical History:   Diagnosis Date    ED (erectile dysfunction) 2007    Hearing reduced     Hemorrhoids     Ill-defined condition     IDIOPATHIC HIVES    Recurrent left inguinal hernia 2/10/2013    repaired 2/21/13    Recurrent right inguinal hernia 8/20/2014    Snoring     Squamous cell cancer of scalp and skin of neck 01/2019    Stroke ([facility] [address] 10/2014    POSSIBLE TIA; MRI DONE TODAY;    Vertigo     Vitiligo       Past Surgical History:   Procedure Laterality Date    HX CHOLECYSTECTOMY  june 1982     Dr. Evelin Booker    HX GI      HEMORRHOIDECTOMY    HX HEMORRHOIDECTOMY      HX HERNIA REPAIR  11-    Dr. Magda Gomez    HX HERNIA REPAIR  12-28-10    Left inguinal indirect hernia and lipoma of cord-Dr. Jericho Rooney    HX HERNIA REPAIR  2-21-13    repair of recurrent left inguinal [id number]. Arabella Montiel    HX HERNIA REPAIR  12-30-14    Excision of suture granuloma right inguinal region and repair of recurrent direct inguinal hernia with P&P-[facility]-DR. Arabella Montiel    HX MALIGNANT SKIN LESION EXCISION  01/2019    squamous cell - back of neck - MOHS - Dr. Nidia Marte W/PIN AND SCREW    HX OTHER SURGICAL      VASECTOMY  REMOVAL OF SPERM CORD LESION  12-28-10    Dr. Marti Lanier HERNIA,5+Y/O,REDUCIBL  12-    Dr. Omid Pratt     Current Outpatient Medications   Medication Sig Dispense Refill    sildenafil, antihypertensive, (REVATIO) 20 mg tablet TAKE THREE to FOUR TABLETS BY MOUTH 20 MINUTES PRIOR TO ANTICIPATED NEED 30 Tab 2    triamcinolone acetonide (KENALOG) 0.025 % topical cream Apply  to affected area two (2) times a day. use thin layer 15 g 0    crisaborole (EUCRISA) 2 % oint by Apply Externally route.  FLAXSEED OIL by Does Not Apply route daily. Allergies   Allergen Reactions    Amoxicillin Hives    Other Medication Other (comments)     PT HAD HIVES FOR 10 MONTHS AFTER ING HR WITH MESH; PT SUSPECTS MESH; HIVES MOSTLY ON ABDOMEN BUT ON LIMPBS & FACE AT TIMES. Family History   Problem Relation Age of Onset   [facility] Stroke Mother         aneurysm - hemorrhagic stroke    Hypertension Father     Stroke Father         started age [de-identified]    Hypertension Sister    [facility] Cancer Sister         Bone    Cancer Daughter         MELANOMA    Anesth Problems Neg Hx      Social History     Tobacco Use    Smoking status: Never Smoker    Smokeless tobacco: Never Used   Substance Use Topics    Alcohol use: Yes     Comment: RARELY     Patient Active Problem List   Diagnosis Code    Inguinal hernia K40.90    Colon cancer screening Z12.11    Hemorrhoids K64.9    Eczema L30.9    Vitiligo L80    Erectile dysfunction due to diseases classified elsewhere N52.1       Depression Risk Factor Screening:     3 most recent PHQ Screens 10/3/2019   Little interest or pleasure in doing things Not at all   Feeling down, depressed, irritable, or hopeless Not at all   Total Score PHQ 2 0     Alcohol Risk Factor Screening: You do not drink alcohol or very rarely.     Functional Ability and Level of Safety:   Hearing Loss  -has hearing aids, only wears them periodically    Activities of Daily Living  The home contains: handrails and grab bars  Patient does total self care    Fall Risk  Fall Risk Assessment, last 12 mths 10/3/2019   Able to walk? Yes   Fall in past 12 months? No   Fall with injury? -   Number of falls in past 12 months -   Fall Risk Score -       Abuse Screen  Patient is not abused    Cognitive Screening   Evaluation of Cognitive Function:  Has your family/caregiver stated any concerns about your memory: no  Normal    Patient Care Team   Patient Care Team:  Tammy Purvis MD as PCP - General (Internal Medicine)    Assessment/Plan   Education and counseling provided:  Are appropriate based on today's review and evaluation    Diagnoses and all orders for this visit:    1. Medicare annual wellness visit, subsequent  -     DC ANNUAL ALCOHOL SCREEN [address]    2. Screening for alcoholism  -     DC ANNUAL ALCOHOL SCREEN 15 MIN    3. Screening for depression  -     DEPRESSION SCREEN ANNUAL      4. Erectile dysfunction  -will give patient a trial of cialis. Script given such that patient can shop for best price. -recommended he discuss with a urologist if this medication is not helpful. 5.  Health Care Maintenance   -script for tdap and prevnar 13 given to patient.  -patient received his flu vaccine today without any complications.       Health Maintenance Due   Topic Date Due    DTaP/Tdap/Td series (1 - Tdap) 06/05/1966    Pneumococcal 65+ years (2 of 2 - PCV13) 01/01/2010    GLAUCOMA SCREENING Q2Y  01/01/2018    MEDICARE YEARLY EXAM  03/08/2019    Influenza Age 9 to Adult  08/01/2019

## 2019-10-03 NOTE — PATIENT INSTRUCTIONS
Medicare Wellness Visit, Male The best way to live healthy is to have a lifestyle where you eat a well-balanced diet, exercise regularly, limit alcohol use, and quit all forms of tobacco/nicotine, if applicable. Regular preventive services are another way to keep healthy. Preventive services (vaccines, screening tests, monitoring & exams) can help personalize your care plan, which helps you manage your own care. Screening tests can find health problems at the earliest stages, when they are easiest to treat. [address] [facility] follows the current, evidence-based guidelines published by the [facility] Dave Nicole ([facility]) when recommending preventive services for our patients. Because we follow these guidelines, sometimes recommendations change over time as research supports it. (For example, a prostate screening blood test is no longer routinely recommended for men with no symptoms.) Of course, you and your doctor may decide to screen more often for some diseases, based on your risk and co-morbidities (chronic disease you are already diagnosed with). Preventive services for you include: - Medicare offers their members a free annual wellness visit, which is time for you and your primary care provider to discuss and plan for your preventive service needs. Take advantage of this benefit every year! 
-All adults over age 72 should receive the recommended pneumonia vaccines. Current USPSTF guidelines recommend a series of two vaccines for the best pneumonia protection.  
-All adults should have a flu vaccine yearly and an ECG.  All adults age 61 and older should receive a shingles vaccine once in their lifetime.   
-All adults age 38-68 who are overweight should have a diabetes screening test once every three years.  
-Other screening tests & preventive services for persons with diabetes include: an eye exam to screen for diabetic retinopathy, a kidney function test, a foot exam, and stricter control over your cholesterol.  
-Cardiovascular screening for adults with routine risk involves an electrocardiogram (ECG) at intervals determined by the provider.  
-Colorectal cancer screening should be done for adults age 54-65 with no increased risk factors for colorectal cancer. There are a number of acceptable methods of screening for this type of cancer. Each test has its own benefits and drawbacks. Discuss with your provider what is most appropriate for you during your annual wellness visit. The different tests include: colonoscopy (considered the best screening method), a fecal occult blood test, a fecal DNA test, and sigmoidoscopy. 
-All adults born between [facility] should be screened once for Hepatitis C. 
-An Abdominal Aortic Aneurysm (AAA) Screening is recommended for men age 73-68 who has ever smoked in their lifetime. Here is a list of your current Health Maintenance items (your personalized list of preventive services) with a due date: 
Health Maintenance Due Topic Date Due  
 DTaP/Tdap/Td  (1 - Tdap) 06/05/1964  Pneumococcal Vaccine (2 of 2 - PCV13) 01/01/2010  Glaucoma Screening   01/01/2018 [facility] Annual Well Visit  03/08/2019  Flu Vaccine  08/01/2019 Vaccine Information Statement Influenza (Flu) Vaccine (Inactivated or Recombinant): What You Need to Know Many Vaccine Information Statements are available in [place] and other languages. See www.immunize.org/vis Hojas de información sobre vacunas están disponibles en español y en muchos otros idiomas. Visite www.immunize.org/vis 1. Why get vaccinated? Influenza vaccine can prevent influenza (flu). Flu is a contagious disease that spreads around the [place] every year, usually between October and May. Anyone can get the flu, but it is more dangerous for some people.  Infants and young children, people 72years of age and older, pregnant women, and people with certain health conditions or a weakened immune system are at greatest risk of flu complications. Pneumonia, bronchitis, sinus infections and ear infections are examples of flu-related complications. If you have a medical condition, such as heart disease, cancer or diabetes, flu can make it worse. Flu can cause fever and chills, sore throat, muscle aches, fatigue, cough, headache, and runny or stuffy nose. Some people may have vomiting and diarrhea, though this is more common in children than adults. Each year thousands of people in the [facility] die from flu, and many more are hospitalized. Flu vaccine prevents millions of illnesses and flu-related visits to the doctor each year. 2. Influenza vaccines CDC recommends everyone 10months of age and older get vaccinated every flu season. Children 6 months through 6years of age may need 2 doses during a single flu season. Everyone else needs only 1 dose each flu season. It takes about 2 weeks for protection to develop after vaccination. There are many flu viruses, and they are always changing. Each year a new flu vaccine is made to protect against three or four viruses that are likely to cause disease in the upcoming flu season. Even when the vaccine doesnt exactly match these viruses, it may still provide some protection. Influenza vaccine does not cause flu. Influenza vaccine may be given at the same time as other vaccines. 3. Talk with your health care provider Tell your vaccine provider if the person getting the vaccine: 
 Has had an allergic reaction after a previous dose of influenza vaccine, or has any severe, life-threatening allergies.  Has ever had Guillain-Barré Syndrome (also called GBS). In some cases, your health care provider may decide to postpone influenza vaccination to a future visit. People with minor illnesses, such as a cold, may be vaccinated.  People who are moderately or severely ill should usually wait until they recover before getting influenza vaccine. Your health care provider can give you more information. 4. Risks of a reaction  Soreness, redness, and swelling where shot is given, fever, muscle aches, and headache can happen after influenza vaccine.  There may be a very small increased risk of Guillain-Barré Syndrome (GBS) after inactivated influenza vaccine (the flu shot). Angie Boop children who get the flu shot along with pneumococcal vaccine (PCV13), and/or DTaP vaccine at the same time might be slightly more likely to have a seizure caused by fever. Tell your health care provider if a child who is getting flu vaccine has ever had a seizure. People sometimes faint after medical procedures, including vaccination. Tell your provider if you feel dizzy or have vision changes or ringing in the ears. As with any medicine, there is a very remote chance of a vaccine causing a severe allergic reaction, other serious injury, or death. 5. What if there is a serious problem? An allergic reaction could occur after the vaccinated person leaves the clinic. If you see signs of a severe allergic reaction (hives, swelling of the face and throat, difficulty breathing, a fast heartbeat, dizziness, or weakness), call 9-1-1 and get the person to the nearest hospital. 
 
For other signs that concern you, call your health care provider. Adverse reactions should be reported to the Vaccine Adverse Event Reporting System (VAERS). Your health care provider will usually file this report, or you can do it yourself. Visit the VAERS website at www.vaers. hhs.gov or call 5-837.399.5553. VAERS is only for reporting reactions, and VAERS staff do not give medical advice.  
 
6. The National Vaccine Injury Compensation Program 
 
The [facility] Vaccine Injury Compensation Program (VICP) is a federal program that was created to compensate people who may have been injured by certain vaccines. Visit the VICP website at www.[facility].gov/vaccinecompensation or call 9-209.441.8976 to learn about the program and about filing a claim. There is a time limit to file a claim for compensation. 7. How can I learn more?  Ask your health care provider.  Call your local or state health department.  Contact the Centers for Disease Control and Prevention (CDC): 
- Call 2-197.356.3551 (1-800-CDC-INFO) or 
- Visit CDCs influenza website at www.cdc.gov/flu Vaccine Information Statement (Interim) Inactivated Influenza Vaccine 8/15/2019 
42 TREMAINE Michel [id number] Department of Health and ProviderTrust Centers for Disease Control and Prevention Office Use Only

## 2019-10-04 LAB
ALBUMIN SERPL-MCNC: 4.1 G/DL (ref 3.5–4.8)
ALBUMIN/GLOB SERPL: 1.6 {RATIO} (ref 1.2–2.2)
ALP SERPL-CCNC: 77 IU/L (ref 39–117)
ALT SERPL-CCNC: 15 IU/L (ref 0–44)
AST SERPL-CCNC: 19 IU/L (ref 0–40)
BASOPHILS # BLD AUTO: 0 X10E3/UL (ref 0–0.2)
BASOPHILS NFR BLD AUTO: 1 %
BILIRUB SERPL-MCNC: 0.5 MG/DL (ref 0–1.2)
BUN SERPL-MCNC: 22 MG/DL (ref 8–27)
BUN/CREAT SERPL: 19 (ref 10–24)
CALCIUM SERPL-MCNC: 9.1 MG/DL (ref 8.6–10.2)
CHLORIDE SERPL-SCNC: 103 MMOL/L (ref 96–106)
CHOLEST SERPL-MCNC: 192 MG/DL (ref 100–199)
CO2 SERPL-SCNC: 20 MMOL/L (ref 20–29)
CREAT SERPL-MCNC: 1.15 MG/DL (ref 0.76–1.27)
EOSINOPHIL # BLD AUTO: 0.2 X10E3/UL (ref 0–0.4)
EOSINOPHIL NFR BLD AUTO: 3 %
ERYTHROCYTE [DISTWIDTH] IN BLOOD BY AUTOMATED COUNT: 13.4 % (ref 12.3–15.4)
GLOBULIN SER CALC-MCNC: 2.6 G/DL (ref 1.5–4.5)
GLUCOSE SERPL-MCNC: 98 MG/DL (ref 65–99)
HCT VFR BLD AUTO: 46.5 % (ref 37.5–51)
HDLC SERPL-MCNC: 63 MG/DL
HGB BLD-MCNC: 15.6 G/DL (ref 13–17.7)
IMM GRANULOCYTES # BLD AUTO: 0 X10E3/UL (ref 0–0.1)
IMM GRANULOCYTES NFR BLD AUTO: 0 %
LDLC SERPL CALC-MCNC: 116 MG/DL (ref 0–99)
LYMPHOCYTES # BLD AUTO: 1.5 X10E3/UL (ref 0.7–3.1)
LYMPHOCYTES NFR BLD AUTO: 28 %
MCH RBC QN AUTO: 31.5 PG (ref 26.6–33)
MCHC RBC AUTO-ENTMCNC: 33.5 G/DL (ref 31.5–35.7)
MCV RBC AUTO: 94 FL (ref 79–97)
MONOCYTES # BLD AUTO: 0.7 X10E3/UL (ref 0.1–0.9)
MONOCYTES NFR BLD AUTO: 14 %
NEUTROPHILS # BLD AUTO: 3 X10E3/UL (ref 1.4–7)
NEUTROPHILS NFR BLD AUTO: 54 %
PLATELET # BLD AUTO: 218 X10E3/UL (ref 150–450)
POTASSIUM SERPL-SCNC: 4.9 MMOL/L (ref 3.5–5.2)
PROT SERPL-MCNC: 6.7 G/DL (ref 6–8.5)
PSA SERPL-MCNC: 1 NG/ML (ref 0–4)
RBC # BLD AUTO: 4.96 X10E6/UL (ref 4.14–5.8)
SODIUM SERPL-SCNC: 140 MMOL/L (ref 134–144)
TRIGL SERPL-MCNC: 65 MG/DL (ref 0–149)
VLDLC SERPL CALC-MCNC: 13 MG/DL (ref 5–40)
WBC # BLD AUTO: 5.5 X10E3/UL (ref 3.4–10.8)

## 2019-10-29 ENCOUNTER — DOCUMENTATION ONLY (OUTPATIENT)
Dept: INTERNAL MEDICINE CLINIC | Age: 74
End: 2019-10-29

## 2019-10-29 NOTE — PROGRESS NOTES
Spoke with pharmacy tech at [facility] to send over documentation of patient's recent pneumonia vaccine. Provided fax number for the office.

## 2020-02-11 ENCOUNTER — OFFICE VISIT (OUTPATIENT)
Dept: INTERNAL MEDICINE CLINIC | Age: 75
End: 2020-02-11

## 2020-02-11 VITALS
RESPIRATION RATE: 20 BRPM | DIASTOLIC BLOOD PRESSURE: 86 MMHG | SYSTOLIC BLOOD PRESSURE: 146 MMHG | TEMPERATURE: 97.9 F | BODY MASS INDEX: 24 KG/M2 | HEART RATE: 64 BPM | WEIGHT: 193 LBS | HEIGHT: 75 IN | OXYGEN SATURATION: 97 %

## 2020-02-11 DIAGNOSIS — N52.9 ERECTILE DYSFUNCTION, UNSPECIFIED ERECTILE DYSFUNCTION TYPE: ICD-10-CM

## 2020-02-11 DIAGNOSIS — I49.9 IRREGULAR HEART BEAT: ICD-10-CM

## 2020-02-11 DIAGNOSIS — N41.9 PROSTATITIS, UNSPECIFIED PROSTATITIS TYPE: Primary | ICD-10-CM

## 2020-02-11 DIAGNOSIS — J30.9 ALLERGIC RHINITIS, UNSPECIFIED SEASONALITY, UNSPECIFIED TRIGGER: ICD-10-CM

## 2020-02-11 NOTE — PROGRESS NOTES
Chief Complaint   Patient presents with    Cold Symptoms     Previous sxs of chest congestion and nasal drainage; increased phlegm daily x4-5 weeks.  Irregular Heart Beat     Pt states that he was evaluated by [facility] on Saturday Morning. Informed pt that he has an irregular heart beat.  Back Pain     Pt reports intermittent \"jabbing pains\" in the upper middle back x3 days. Using heat and ibuprofen for pain. Does yard work and is active in [employer], but not familiar with what caused the sxs.     Wax in Ear     Requesting cerumen removal.

## 2020-02-11 NOTE — PROGRESS NOTES
Subjective:      Grayson Morris is a 66 y.o. male who presents today with several concerns. 1. Increased urination - . Was seen at [facility] - diagnosed with prostatitis and was given course of bactrim. He states that after 2 days of bactrim, his nocturia improved. and was told he had an irregular heart beat. He declined for them to do an EKG at the time of his visit. He states that he had been there too long. He denies any chest pains with exercise. 2.  He is still having trouble with ED. I had recommended he consult with a urologist as he has tried several medications. 3. Back pain - intermittent. Mid upper back. Improves with use of advil. He exercises vividly. 4. Ear wax - wears hearing aids periodically. Saw his ENT a couple weeks ago. Wants to know if he has wax    5. Increased phlegm production     Patient Active Problem List   Diagnosis Code    Inguinal hernia K40.90    Colon cancer screening Z12.11    Hemorrhoids K64.9    Eczema L30.9    Vitiligo L80    Erectile dysfunction due to diseases classified elsewhere N52.1     Current Outpatient Medications   Medication Sig Dispense Refill    tadalafil (CIALIS) 10 mg tablet 1 tab 20 minutes prior to need. 10 Tab 0    crisaborole (EUCRISA) 2 % oint by Apply Externally route.  FLAXSEED OIL by Does Not Apply route daily.  triamcinolone acetonide (KENALOG) 0.025 % topical cream Apply  to affected area two (2) times a day. use thin layer 15 g 0        Review of Systems    Pertinent items are noted in HPI. Objective:     Visit Vitals  /86 (BP 1 Location: Left arm, BP Patient Position: Sitting)   Pulse 64   Temp 97.9 °F (36.6 °C) (Oral)   Resp 20   Ht 6' 2.5\" (1.892 m)   Wt 193 lb (87.5 kg)   SpO2 97%   BMI 24.45 kg/m²     General appearance: alert, cooperative, no distress, appears stated age  Head: Normocephalic, without obvious abnormality, atraumatic  Ears: normal TM's and external ear canals AU.  Minimal cerumen in external canal bilaterally  Nose: Nares normal. Septum midline. Mucosa pale and boggy with clear drainage bilaterally. Lungs: clear to auscultation bilaterally  Heart: regular rate and rhythm with occasional ectopic beat    Assessment/Plan:     1. Prostatitis, unspecified prostatitis type  -complete course of antibiotics given by [facility].  We have called them to forward records    2. Erectile dysfunction, unspecified erectile dysfunction type  -again strongly encouraged patient to consult with urologist as he has taken multiple medications and has been doubling them on his own without satisfaction to him. 3. Irregular heart beat  -PVC noted on EKG. Encouraged patient to limit his caffeine intake to 8 ounces per day and to remain hydrated. - AMB POC EKG ROUTINE W/ 12 LEADS, INTER & REP    4. Allergic rhinitis, unspecified seasonality, unspecified trigger  -recommended use of otc antihistamine such as zyrtec 10mg nightly for the next 3-4 months. Follow-up Disposition:     Return if symptoms worsen or fail to improve. Advised patient to call back or return to office if symptoms worsen/change/persist.     Discussed expected course/resolution/complications of diagnosis in detail with patient. Medication risks/benefits/costs/interactions/alternatives discussed with patient. Patient was given an after visit summary which includes diagnoses, current medications, & vitals. Patient expressed understanding with the diagnosis and plan.

## 2020-10-06 ENCOUNTER — OFFICE VISIT (OUTPATIENT)
Dept: INTERNAL MEDICINE CLINIC | Age: 75
End: 2020-10-06
Payer: MEDICARE

## 2020-10-06 ENCOUNTER — HOSPITAL ENCOUNTER (OUTPATIENT)
Dept: LAB | Age: 75
Discharge: HOME | End: 2020-10-06
Payer: MEDICARE

## 2020-10-06 VITALS
TEMPERATURE: 97.3 F | WEIGHT: 188 LBS | SYSTOLIC BLOOD PRESSURE: 135 MMHG | DIASTOLIC BLOOD PRESSURE: 70 MMHG | RESPIRATION RATE: 16 BRPM | HEART RATE: 84 BPM | BODY MASS INDEX: 23.81 KG/M2 | OXYGEN SATURATION: 96 %

## 2020-10-06 DIAGNOSIS — Z12.5 PROSTATE CANCER SCREENING: ICD-10-CM

## 2020-10-06 DIAGNOSIS — E78.2 MIXED HYPERLIPIDEMIA: ICD-10-CM

## 2020-10-06 DIAGNOSIS — I49.9 CARDIAC ARRHYTHMIA, UNSPECIFIED CARDIAC ARRHYTHMIA TYPE: ICD-10-CM

## 2020-10-06 DIAGNOSIS — Z13.31 SCREENING FOR DEPRESSION: ICD-10-CM

## 2020-10-06 DIAGNOSIS — Z00.00 MEDICARE ANNUAL WELLNESS VISIT, SUBSEQUENT: Primary | ICD-10-CM

## 2020-10-06 PROCEDURE — G8420 CALC BMI NORM PARAMETERS: HCPCS | Performed by: INTERNAL MEDICINE

## 2020-10-06 PROCEDURE — G0439 PPPS, SUBSEQ VISIT: HCPCS | Performed by: INTERNAL MEDICINE

## 2020-10-06 PROCEDURE — 80053 COMPREHEN METABOLIC PANEL: CPT

## 2020-10-06 PROCEDURE — 93005 ELECTROCARDIOGRAM TRACING: CPT | Performed by: INTERNAL MEDICINE

## 2020-10-06 PROCEDURE — 99213 OFFICE O/P EST LOW 20 MIN: CPT | Performed by: INTERNAL MEDICINE

## 2020-10-06 PROCEDURE — G8427 DOCREV CUR MEDS BY ELIG CLIN: HCPCS | Performed by: INTERNAL MEDICINE

## 2020-10-06 PROCEDURE — 36415 COLL VENOUS BLD VENIPUNCTURE: CPT

## 2020-10-06 PROCEDURE — 84443 ASSAY THYROID STIM HORMONE: CPT

## 2020-10-06 PROCEDURE — G8536 NO DOC ELDER MAL SCRN: HCPCS | Performed by: INTERNAL MEDICINE

## 2020-10-06 PROCEDURE — G0463 HOSPITAL OUTPT CLINIC VISIT: HCPCS | Performed by: INTERNAL MEDICINE

## 2020-10-06 PROCEDURE — 1101F PT FALLS ASSESS-DOCD LE1/YR: CPT | Performed by: INTERNAL MEDICINE

## 2020-10-06 PROCEDURE — 85025 COMPLETE CBC W/AUTO DIFF WBC: CPT

## 2020-10-06 PROCEDURE — G0444 DEPRESSION SCREEN ANNUAL: HCPCS | Performed by: INTERNAL MEDICINE

## 2020-10-06 PROCEDURE — G8510 SCR DEP NEG, NO PLAN REQD: HCPCS | Performed by: INTERNAL MEDICINE

## 2020-10-06 PROCEDURE — 93010 ELECTROCARDIOGRAM REPORT: CPT | Performed by: INTERNAL MEDICINE

## 2020-10-06 PROCEDURE — 84153 ASSAY OF PSA TOTAL: CPT

## 2020-10-06 PROCEDURE — 80061 LIPID PANEL: CPT

## 2020-10-06 NOTE — PATIENT INSTRUCTIONS
Medicare Wellness Visit, Male    The best way to live healthy is to have a lifestyle where you eat a well-balanced diet, exercise regularly, limit alcohol use, and quit all forms of tobacco/nicotine, if applicable. Regular preventive services are another way to keep healthy. Preventive services (vaccines, screening tests, monitoring & exams) can help personalize your care plan, which helps you manage your own care. Screening tests can find health problems at the earliest stages, when they are easiest to treat. [facility] follows the current, evidence-based guidelines published by the [facility] Dave Nicole ([facility]) when recommending preventive services for our patients. Because we follow these guidelines, sometimes recommendations change over time as research supports it. (For example, a prostate screening blood test is no longer routinely recommended for men with no symptoms). Of course, you and your doctor may decide to screen more often for some diseases, based on your risk and co-morbidities (chronic disease you are already diagnosed with). Preventive services for you include:  - Medicare offers their members a free annual wellness visit, which is time for you and your primary care provider to discuss and plan for your preventive service needs. Take advantage of this benefit every year!  -All adults over age 72 should receive the recommended pneumonia vaccines. Current USPSTF guidelines recommend a series of two vaccines for the best pneumonia protection.   -All adults should have a flu vaccine yearly and tetanus vaccine every 10 years.  -All adults age 48 and older should receive the shingles vaccines (series of two vaccines).        -All adults age 38-68 who are overweight should have a diabetes screening test once every three years.   -Other screening tests & preventive services for persons with diabetes include: an eye exam to screen for diabetic retinopathy, a kidney function test, a foot exam, and stricter control over your cholesterol.   -Cardiovascular screening for adults with routine risk involves an electrocardiogram (ECG) at intervals determined by the provider.   -Colorectal cancer screening should be done for adults age 54-65 with no increased risk factors for colorectal cancer. There are a number of acceptable methods of screening for this type of cancer. Each test has its own benefits and drawbacks. Discuss with your provider what is most appropriate for you during your annual wellness visit. The different tests include: colonoscopy (considered the best screening method), a fecal occult blood test, a fecal DNA test, and sigmoidoscopy.  -All adults born between [facility] should be screened once for Hepatitis C.  -An Abdominal Aortic Aneurysm (AAA) Screening is recommended for men age 73-68 who has ever smoked in their lifetime.

## 2020-10-06 NOTE — PROGRESS NOTES
Verified name and birth date for privacy precautions. Chart reviewed in preparation for today's visit.      Chief Complaint   Patient presents with   [address] Maintenance Due   Topic    Statin Therapy     DTaP/Tdap/Td series (1 - Tdap)    GLAUCOMA SCREENING Q2Y     Flu Vaccine (1)    Medicare Yearly Exam          Wt Readings from Last 3 Encounters:   10/06/20 188 lb (85.3 kg)   02/11/20 193 lb (87.5 kg)   10/03/19 188 lb 12.8 oz (85.6 kg)     Temp Readings from Last 3 Encounters:   10/06/20 97.3 °F (36.3 °C) (Temporal)   02/11/20 97.9 °F (36.6 °C) (Oral)   10/03/19 98 °F (36.7 °C) (Oral)     BP Readings from Last 3 Encounters:   10/06/20 (!) 140/70   02/11/20 146/86   10/03/19 152/90     Pulse Readings from Last 3 Encounters:   10/06/20 84   02/11/20 64   10/03/19 87         Learning Assessment:  :     Learning Assessment 4/3/2019 7/1/2014   PRIMARY LEARNER [facility]   HIGHEST LEVEL OF EDUCATION - PRIMARY LEARNER  > 4 YEARS OF COLLEGE > 4 YEARS OF COLLEGE   BARRIERS PRIMARY LEARNER - NONE   CO-LEARNER CAREGIVER - No   PRIMARY LANGUAGE ENGLISH ENGLISH    NEED - No   LEARNER PREFERENCE PRIMARY DEMONSTRATION READING     - DEMONSTRATION   LEARNING SPECIAL TOPICS - no   ANSWERED BY patient patient   RELATIONSHIP SELF SELF       Depression Screening:  :     3 most recent PHQ Screens 10/6/2020   Little interest or pleasure in doing things Not at all   Feeling down, depressed, irritable, or hopeless Not at all   Total Score PHQ 2 0

## 2020-10-06 NOTE — PROGRESS NOTES
Subjective:      Skinny Lima is a 66 y.o. male who presents today for his Medicare Wellness Exam     Following with Dr. Maya Odonnell, [facility] for his eczema. Remains active. Lives independently with his wife. Patient Active Problem List   Diagnosis Code    Inguinal hernia K40.90    Colon cancer screening Z12.11    Hemorrhoids K64.9    Eczema L30.9    Vitiligo L80    Erectile dysfunction due to diseases classified elsewhere N52.1     Current Outpatient Medications   Medication Sig Dispense Refill    crisaborole (EUCRISA) 2 % oint by Apply Externally route.  FLAXSEED OIL by Does Not Apply route daily.  triamcinolone acetonide (KENALOG) 0.025 % topical cream Apply  to affected area two (2) times a day. use thin layer 15 g 0    tadalafil (CIALIS) 10 mg tablet 1 tab 20 minutes prior to need. 10 Tab 0        Review of Systems    Pertinent items are noted in HPI. Objective: Wt Readings from Last 3 Encounters:   10/06/20 188 lb (85.3 kg)   02/11/20 193 lb (87.5 kg)   10/03/19 188 lb 12.8 oz (85.6 kg)     BP Readings from Last 3 Encounters:   10/06/20 (!) 140/70. 135/70 upon recheck in left arm sitting   02/11/20 146/86   10/03/19 152/90     Visit Vitals  /70   Pulse 84   Temp 97.3 °F (36.3 °C) (Temporal)   Resp 16   Wt 188 lb (85.3 kg)   SpO2 96%   BMI 23.81 kg/m²     General appearance: alert, cooperative, no distress, appears stated age  Head: Normocephalic, without obvious abnormality, atraumatic  Neck: supple, symmetrical, trachea midline, no adenopathy, no carotid bruit and no JVD  Lungs: clear to auscultation bilaterally  Heart: irregularly irregular rhythm  Abdomen: soft, non-tender. Bowel sounds normal. No masses,  no organomegaly  Extremities: extremities normal, atraumatic, no cyanosis or edema  Pulses: 2+ and symmetric    EKG: normal EKG, normal sinus rhythm, unchanged from previous tracings, PAC's noted  . Assessment/Plan:     1.  Medicare annual wellness visit, subsequent  -completed today    - DEPRESSION SCREEN ANNUAL    2. Screening for depression    - DEPRESSION SCREEN ANNUAL    3. Mixed hyperlipidemia  -diet controlled. - CBC WITH AUTOMATED DIFF  - METABOLIC PANEL, COMPREHENSIVE  - LIPID PANEL    4. Prostate cancer screening    - PSA SCREENING (SCREENING)    5. Cardiac arrhythmia, unspecified cardiac arrhythmia type  -frequent pac's noted on ekg, unchanged from prior studies. Patient is asymptomatic. Has cut back on caffeine intake. Is active working in yard daily.     - AMB POC EKG ROUTINE W/ 12 LEADS, INTER & REP  - TSH 3RD GENERATION     Follow-up Disposition:     Follow up yearly     Return if symptoms worsen or fail to improve. Advised patient to call back or return to office if symptoms worsen/change/persist.     Discussed expected course/resolution/complications of diagnosis in detail with patient. Medication risks/benefits/costs/interactions/alternatives discussed with patient. Patient was given an after visit summary which includes diagnoses, current medications, & vitals. Patient expressed understanding with the diagnosis and plan. This is the Subsequent Medicare Annual Wellness Exam, performed 12 months or more after the Initial AWV or the last Subsequent AWV    I have reviewed the patient's medical history in detail and updated the computerized patient record.      History     Patient Active Problem List   Diagnosis Code    Inguinal hernia K40.90    Colon cancer screening Z12.11    Hemorrhoids K64.9    Eczema L30.9    Vitiligo L80    Erectile dysfunction due to diseases classified elsewhere N52.1     Past Medical History:   Diagnosis Date    ED (erectile dysfunction) 2007    Hearing reduced     Hemorrhoids     Ill-defined condition     IDIOPATHIC HIVES    Recurrent left inguinal hernia 2/10/2013    repaired 2/21/13    Recurrent right inguinal hernia 8/20/2014    Snoring     Squamous cell cancer of scalp and skin of neck 01/2019    Stroke ([facility] [address] 10/2014    POSSIBLE TIA; MRI DONE TODAY;    Vertigo     Vitiligo       Past Surgical History:   Procedure Laterality Date    HX CHOLECYSTECTOMY  june 1982     Dr. Mary Alcantara    HX GI      HEMORRHOIDECTOMY    HX HEMORRHOIDECTOMY      HX HERNIA REPAIR  11-    Dr. Fifi Fallon  12-28-10    Left inguinal indirect hernia and lipoma of cord-Dr. Miguel Brewster    HX HERNIA REPAIR  2-21-13    repair of recurrent left inguinal iwrtch-[facility] G[facility]    HX HERNIA REPAIR  12-30-14    Excision of suture granuloma right inguinal region and repair of recurrent direct inguinal hernia with P&P-[facility]-DR. Can Gist    HX MALIGNANT SKIN LESION EXCISION  01/2019    squamous cell - back of neck - [facility] - Dr. Baltazar Amador W/PIN AND SCREW    HX OTHER SURGICAL      VASECTOMY    REMOVAL OF SPERM CORD LESION  12-28-10    Dr. Maguire Ano HERNIA,5+Y/O,REDUCIBL  12-    Dr. Can Gist     Current Outpatient Medications   Medication Sig Dispense Refill    crisaborole (EUCRISA) 2 % oint by Apply Externally route.  FLAXSEED OIL by Does Not Apply route daily.  triamcinolone acetonide (KENALOG) 0.025 % topical cream Apply  to affected area two (2) times a day. use thin layer 15 g 0     Allergies   Allergen Reactions    Amoxicillin Hives    Other Medication Other (comments)     PT HAD HIVES FOR 10 MONTHS AFTER ING HR WITH MESH; PT SUSPECTS MESH; HIVES MOSTLY ON ABDOMEN BUT ON LIMPBS & FACE AT TIMES.          Family History   Problem Relation Age of Onset   Jamal Herrera Stroke Mother         aneurysm - hemorrhagic stroke    Hypertension Father    Jamal Herrera Stroke Father         started age [de-identified]    Hypertension Sister     Cancer Sister         Bone    Cancer Daughter         MELANOMA    Anesth Problems Neg Hx      Social History     Tobacco Use    Smoking status: Never Smoker    Smokeless tobacco: Never Used   Substance Use Topics    Alcohol use: Yes     Comment: RARELY       Depression Risk Factor Screening:     3 most recent PHQ Screens 10/6/2020   Little interest or pleasure in doing things Not at all   Feeling down, depressed, irritable, or hopeless Not at all   Total Score PHQ 2 0       Alcohol Risk Screen   Do you average more than 1 drink per night or more than 7 drinks a week: No    In the past three months have you have had more than 4 drinks containing alcohol on one occasion: No        Functional Ability and Level of Safety:   Hearing: The patient wears hearing aids. - has hearing aids. Uses only intermittently     Activities of Daily Living: The home contains: no safety equipment. Patient does total self care     Ambulation: with no difficulty     Fall Risk:  Fall Risk Assessment, last 12 mths 10/3/2019   Able to walk? Yes   Fall in past 12 months? No   Fall with injury? -   Number of falls in past 12 months -   Fall Risk Score -     Abuse Screen:  Patient is not abused       Cognitive Screening   Has your family/caregiver stated any concerns about your memory: no         Patient Care Team   Patient Care Team:  Ceci Wahl MD as PCP - General (Internal Medicine)  Ceci Wahl MD as PCP - [facility] Empaneled Provider    Assessment/Plan   Education and counseling provided:  Are appropriate based on today's review and evaluation    Diagnoses and all orders for this visit:    1. Medicare annual wellness visit, subsequent  -     Antonieta Beck    2.  Screening for depression  -     [address]

## 2020-10-07 LAB
ALBUMIN SERPL-MCNC: 4 G/DL (ref 3.7–4.7)
ALBUMIN/GLOB SERPL: 1.5 {RATIO} (ref 1.2–2.2)
ALP SERPL-CCNC: 78 IU/L (ref 39–117)
ALT SERPL-CCNC: 19 IU/L (ref 0–44)
AST SERPL-CCNC: 25 IU/L (ref 0–40)
BASOPHILS # BLD AUTO: 0 X10E3/UL (ref 0–0.2)
BASOPHILS NFR BLD AUTO: 1 %
BILIRUB SERPL-MCNC: 0.5 MG/DL (ref 0–1.2)
BUN SERPL-MCNC: 22 MG/DL (ref 8–27)
BUN/CREAT SERPL: 19 (ref 10–24)
CALCIUM SERPL-MCNC: 9.5 MG/DL (ref 8.6–10.2)
CHLORIDE SERPL-SCNC: 102 MMOL/L (ref 96–106)
CHOLEST SERPL-MCNC: 204 MG/DL (ref 100–199)
CO2 SERPL-SCNC: 25 MMOL/L (ref 20–29)
CREAT SERPL-MCNC: 1.15 MG/DL (ref 0.76–1.27)
EOSINOPHIL # BLD AUTO: 0.2 X10E3/UL (ref 0–0.4)
EOSINOPHIL NFR BLD AUTO: 3 %
ERYTHROCYTE [DISTWIDTH] IN BLOOD BY AUTOMATED COUNT: 12.2 % (ref 11.6–15.4)
GLOBULIN SER CALC-MCNC: 2.7 G/DL (ref 1.5–4.5)
GLUCOSE SERPL-MCNC: 65 MG/DL (ref 65–99)
HCT VFR BLD AUTO: 45.4 % (ref 37.5–51)
HDLC SERPL-MCNC: 61 MG/DL
HGB BLD-MCNC: 15.4 G/DL (ref 13–17.7)
IMM GRANULOCYTES # BLD AUTO: 0 X10E3/UL (ref 0–0.1)
IMM GRANULOCYTES NFR BLD AUTO: 1 %
LDLC SERPL CALC-MCNC: 122 MG/DL (ref 0–99)
LYMPHOCYTES # BLD AUTO: 1.4 X10E3/UL (ref 0.7–3.1)
LYMPHOCYTES NFR BLD AUTO: 26 %
MCH RBC QN AUTO: 31.7 PG (ref 26.6–33)
MCHC RBC AUTO-ENTMCNC: 33.9 G/DL (ref 31.5–35.7)
MCV RBC AUTO: 93 FL (ref 79–97)
MONOCYTES # BLD AUTO: 0.6 X10E3/UL (ref 0.1–0.9)
MONOCYTES NFR BLD AUTO: 12 %
NEUTROPHILS # BLD AUTO: 3.2 X10E3/UL (ref 1.4–7)
NEUTROPHILS NFR BLD AUTO: 57 %
PLATELET # BLD AUTO: 211 X10E3/UL (ref 150–450)
POTASSIUM SERPL-SCNC: 4.9 MMOL/L (ref 3.5–5.2)
PROT SERPL-MCNC: 6.7 G/DL (ref 6–8.5)
PSA SERPL-MCNC: 1 NG/ML (ref 0–4)
RBC # BLD AUTO: 4.86 X10E6/UL (ref 4.14–5.8)
SODIUM SERPL-SCNC: 139 MMOL/L (ref 134–144)
TRIGL SERPL-MCNC: 118 MG/DL (ref 0–149)
TSH SERPL DL<=0.005 MIU/L-ACNC: 4.11 UIU/ML (ref 0.45–4.5)
VLDLC SERPL CALC-MCNC: 21 MG/DL (ref 5–40)
WBC # BLD AUTO: 5.4 X10E3/UL (ref 3.4–10.8)

## 2021-04-30 ENCOUNTER — OFFICE VISIT (OUTPATIENT)
Dept: INTERNAL MEDICINE CLINIC | Age: 76
End: 2021-04-30
Payer: MEDICARE

## 2021-04-30 VITALS
HEART RATE: 82 BPM | SYSTOLIC BLOOD PRESSURE: 130 MMHG | DIASTOLIC BLOOD PRESSURE: 88 MMHG | RESPIRATION RATE: 16 BRPM | BODY MASS INDEX: 23.92 KG/M2 | WEIGHT: 192.4 LBS | TEMPERATURE: 98 F | HEIGHT: 75 IN | OXYGEN SATURATION: 97 %

## 2021-04-30 DIAGNOSIS — R31.9 HEMATURIA, UNSPECIFIED TYPE: Primary | ICD-10-CM

## 2021-04-30 LAB
BILIRUB UR QL STRIP: NEGATIVE
GLUCOSE UR-MCNC: NEGATIVE MG/DL
KETONES P FAST UR STRIP-MCNC: NEGATIVE MG/DL
PH UR STRIP: 5 [PH] (ref 4.6–8)
PROT UR QL STRIP: NEGATIVE
SP GR UR STRIP: 1.03 (ref 1–1.03)
UA UROBILINOGEN AMB POC: NORMAL (ref 0.2–1)
URINALYSIS CLARITY POC: CLEAR
URINALYSIS COLOR POC: NORMAL
URINE BLOOD POC: NORMAL
URINE LEUKOCYTES POC: NEGATIVE
URINE NITRITES POC: NEGATIVE

## 2021-04-30 PROCEDURE — 1101F PT FALLS ASSESS-DOCD LE1/YR: CPT | Performed by: INTERNAL MEDICINE

## 2021-04-30 PROCEDURE — 81003 URINALYSIS AUTO W/O SCOPE: CPT | Performed by: INTERNAL MEDICINE

## 2021-04-30 PROCEDURE — G0463 HOSPITAL OUTPT CLINIC VISIT: HCPCS | Performed by: INTERNAL MEDICINE

## 2021-04-30 PROCEDURE — G8427 DOCREV CUR MEDS BY ELIG CLIN: HCPCS | Performed by: INTERNAL MEDICINE

## 2021-04-30 PROCEDURE — G8420 CALC BMI NORM PARAMETERS: HCPCS | Performed by: INTERNAL MEDICINE

## 2021-04-30 PROCEDURE — G8536 NO DOC ELDER MAL SCRN: HCPCS | Performed by: INTERNAL MEDICINE

## 2021-04-30 PROCEDURE — 99213 OFFICE O/P EST LOW 20 MIN: CPT | Performed by: INTERNAL MEDICINE

## 2021-04-30 PROCEDURE — G8510 SCR DEP NEG, NO PLAN REQD: HCPCS | Performed by: INTERNAL MEDICINE

## 2021-04-30 RX ORDER — SILDENAFIL 100 MG/1
100 TABLET, FILM COATED ORAL AS NEEDED
COMMUNITY

## 2021-04-30 NOTE — PROGRESS NOTES
Chief Complaint   Patient presents with    Wax in Ear    Blood in Urine     Reviewed record in preparation for visit and have obtained necessary documentation. Identified pt with two pt identifiers(name and ). Health Maintenance Due   Topic    Hepatitis C Screening     COVID-19 Vaccine (1)    DTaP/Tdap/Td series (1 - Tdap)         Chief Complaint   Patient presents with    Wax in Ear    Blood in Urine        Wt Readings from Last 3 Encounters:   10/06/20 188 lb (85.3 kg)   20 193 lb (87.5 kg)   10/03/19 188 lb 12.8 oz (85.6 kg)     Temp Readings from Last 3 Encounters:   10/06/20 97.3 °F (36.3 °C) (Temporal)   20 97.9 °F (36.6 °C) (Oral)   10/03/19 98 °F (36.7 °C) (Oral)     BP Readings from Last 3 Encounters:   10/06/20 135/70   20 146/86   10/03/19 152/90     Pulse Readings from Last 3 Encounters:   10/06/20 84   20 64   10/03/19 87           Learning Assessment:  :     Learning Assessment 4/3/2019 2014   PRIMARY LEARNER Patient Patient   HIGHEST LEVEL OF EDUCATION - PRIMARY LEARNER  > 4 YEARS OF COLLEGE > 4 YEARS OF COLLEGE   BARRIERS PRIMARY LEARNER - NONE   CO-LEARNER CAREGIVER - No   PRIMARY LANGUAGE ENGLISH ENGLISH    NEED - No   LEARNER PREFERENCE PRIMARY DEMONSTRATION READING     - DEMONSTRATION   LEARNING SPECIAL TOPICS - no   ANSWERED BY patient patient   RELATIONSHIP SELF SELF       Depression Screening:  :     3 most recent PHQ Screens 2021   Little interest or pleasure in doing things Not at all   Feeling down, depressed, irritable, or hopeless Not at all   Total Score PHQ 2 0       Fall Risk Assessment:  :     Fall Risk Assessment, last 12 mths 2021   Able to walk? Yes   Fall in past 12 months? 0   Do you feel unsteady? 0   Are you worried about falling 0   Number of falls in past 12 months -   Fall with injury?  -       Abuse Screening:  :     Abuse Screening Questionnaire 4/3/2019 3/7/2018 2014   Do you ever feel afraid of your partner? N N N   Are you in a relationship with someone who physically or mentally threatens you? N N N   Is it safe for you to go home? Lance Mahmood       Coordination of Care Questionnaire:  :     1) Have you been to an emergency room, urgent care clinic since your last visit? no   Hospitalized since your last visit? no             2) Have you seen or consulted any other health care providers outside of [address] [place] [address] since your last visit? yes  (Include any pap smears or colon screenings in this section.)    3) Do you have an Advance Directive on file? no    4) Are you interested in receiving information on Advance Directives? NO      Patient is accompanied by self I have received verbal consent from Tessie Lopez to discuss any/all medical information while they are present in the room. Reviewed record  In preparation for visit and have obtained necessary documentation.

## 2021-05-01 ENCOUNTER — TELEPHONE (OUTPATIENT)
Dept: INTERNAL MEDICINE CLINIC | Age: 76
End: 2021-05-01

## 2021-05-01 NOTE — TELEPHONE ENCOUNTER
On call- having recurrent hemospermia. Had a normal urine dip yesterday. No other urinary symptoms. Has a urologist.  - advised See urologist as directed next week. He's comfortable with this.

## 2021-05-19 NOTE — PROGRESS NOTES
Acute Care Note    Joby Connelly is 66 y.o. male. he presents for evaluation of Wax in Ear and Blood in Urine      The patient presents with concerns of having seen a pink tinge to his urine. He has had no dysuria or pelvic pain. He notes that after an initial urination in the morning, the pink tinge resolved. He has not seen any since (he has urinated twice since then). He has had no urinary/pelvic trauma. He also has a complaint of the feeling of some wax in his ear. This was evaluated. Prior to Admission medications    Medication Sig Start Date End Date Taking? Authorizing Provider   sildenafil citrate (Viagra) 100 mg tablet Take 100 mg by mouth as needed for Erectile Dysfunction. Yes Provider, Historical   FLAXSEED OIL by Does Not Apply route daily. Yes Provider, Historical   triamcinolone acetonide (KENALOG) 0.025 % topical cream Apply  to affected area two (2) times a day. use thin layer 10/14/14  Yes Lionel Reilly MD   crisaborole (EUCRISA) 2 % oint by Apply Externally route. Provider, Historical         Patient Active Problem List   Diagnosis Code    Inguinal hernia K40.90    Colon cancer screening Z12.11    Hemorrhoids K64.9    Eczema L30.9    Vitiligo L80    Erectile dysfunction due to diseases classified elsewhere N52.1         Review of Systems   Constitutional: Negative. Respiratory: Negative. Cardiovascular: Negative. Gastrointestinal: Negative. Genitourinary: Positive for hematuria. Visit Vitals  /88 (BP 1 Location: Left arm, BP Patient Position: Sitting, BP Cuff Size: Large adult)   Pulse 82   Temp 98 °F (36.7 °C) (Temporal)   Resp 16   Ht 6' 2.5\" (1.892 m)   Wt 192 lb 6.4 oz (87.3 kg)   SpO2 97%   BMI 24.37 kg/m²       Physical Exam  Constitutional:       Appearance: He is well-developed. HENT:      Right Ear: Ear canal normal. There is no impacted cerumen. Left Ear: Ear canal normal. There is no impacted cerumen. Cardiovascular:      Rate and Rhythm: Normal rate and regular rhythm. Pulmonary:      Effort: Pulmonary effort is normal.      Breath sounds: Normal breath sounds. Results for orders placed or performed in visit on 04/30/21   AMB POC URINALYSIS DIP STICK AUTO W/O MICRO     Status: None   Result Value Ref Range Status    Color (UA POC) Orange  Final    Clarity (UA POC) Clear  Final    Glucose (UA POC) Negative Negative Final    Bilirubin (UA POC) Negative Negative Final    Ketones (UA POC) Negative Negative Final    Specific gravity (UA POC) 1.030 1.001 - 1.035 Final    Blood (UA POC) Trace Negative Final    pH (UA POC) 5.0 4.6 - 8.0 Final    Protein (UA POC) Negative Negative Final    Urobilinogen (UA POC) 0.2 mg/dL 0.2 - 1 Final    Nitrites (UA POC) Negative Negative Final    Leukocyte esterase (UA POC) Negative Negative Final                   ASSESSMENT/PLAN  Diagnoses and all orders for this visit:    1. Hematuria, unspecified type - Urinalysis shows trace blood at this time. Discussed that this could have possibly come from urethral irritation. Advised that if it recurs, he will need to have a urology evaluation. The patient endorses understanding.   -     AMB POC URINALYSIS DIP STICK AUTO W/O MICRO         Advised the patient to call back or return to office if symptoms worsen/change/persist.   Discussed expected course/resolution/complications of diagnosis in detail with patient. Medication risks/benefits/costs/interactions/alternatives discussed with patient. The patient was given an after visit summary which includes diagnoses, current medications, & vitals. They expressed understanding with the diagnosis and plan.

## 2021-07-08 ENCOUNTER — TELEPHONE (OUTPATIENT)
Dept: INTERNAL MEDICINE CLINIC | Age: 76
End: 2021-07-08

## 2021-07-08 NOTE — TELEPHONE ENCOUNTER
Juan Manuel Dumont went to his appointment to get a new hearing aid    There is a piece of the old hearing aid stuck in his ear and the doctor he saw said he had to come to pcp    Told pt all physicians are fully booked for today but still wants to wait to see if some can take the hearing aid out of his ear    I have pt scheduled with Nba Fuentes 7/9 @ 9a    Pls give pt a call [phone]

## 2021-07-09 ENCOUNTER — OFFICE VISIT (OUTPATIENT)
Dept: INTERNAL MEDICINE CLINIC | Age: 76
End: 2021-07-09
Payer: MEDICARE

## 2021-07-09 VITALS
TEMPERATURE: 97.3 F | DIASTOLIC BLOOD PRESSURE: 72 MMHG | WEIGHT: 191 LBS | OXYGEN SATURATION: 97 % | BODY MASS INDEX: 23.75 KG/M2 | HEIGHT: 75 IN | RESPIRATION RATE: 16 BRPM | HEART RATE: 85 BPM | SYSTOLIC BLOOD PRESSURE: 118 MMHG

## 2021-07-09 DIAGNOSIS — T16.1XXA FOREIGN BODY OF RIGHT EAR, INITIAL ENCOUNTER: Primary | ICD-10-CM

## 2021-07-09 PROCEDURE — G8427 DOCREV CUR MEDS BY ELIG CLIN: HCPCS | Performed by: INTERNAL MEDICINE

## 2021-07-09 PROCEDURE — 1101F PT FALLS ASSESS-DOCD LE1/YR: CPT | Performed by: INTERNAL MEDICINE

## 2021-07-09 PROCEDURE — 99212 OFFICE O/P EST SF 10 MIN: CPT | Performed by: INTERNAL MEDICINE

## 2021-07-09 PROCEDURE — G8536 NO DOC ELDER MAL SCRN: HCPCS | Performed by: INTERNAL MEDICINE

## 2021-07-09 PROCEDURE — G0463 HOSPITAL OUTPT CLINIC VISIT: HCPCS | Performed by: INTERNAL MEDICINE

## 2021-07-09 PROCEDURE — G8432 DEP SCR NOT DOC, RNG: HCPCS | Performed by: INTERNAL MEDICINE

## 2021-07-09 PROCEDURE — G8420 CALC BMI NORM PARAMETERS: HCPCS | Performed by: INTERNAL MEDICINE

## 2021-07-09 PROCEDURE — 69200 CLEAR OUTER EAR CANAL: CPT | Performed by: INTERNAL MEDICINE

## 2021-07-09 NOTE — PROGRESS NOTES
Radha Thompson is a 64 y.o. male who was seen in clinic today (7/9/2021) for an acute visit. Assessment & Plan:   Below is the assessment and plan developed based on review of pertinent history, physical exam, labs, studies, and medications. 1. Foreign body of right ear, initial encounter  Comments:  new dx, removed hearing aide cap w/o difficultly. No need for ear drops. Follow up with audiologist as scheduled  Orders:  -     REMV EXT CANAL FOREIGN BODY      Follow-up and Dispositions    · Return if symptoms worsen or fail to improve. Subjective/Objective:   Shelbi Light was seen today for Ear Pain (no pain, hearing aid stuck in ear)    He went to audiologist yesterday for hearing evaluation. Told they could not do it b/c there was a foreign body in the ear. He is asymptomatic. No pain, tinnitus, discharge, or hearing loss. Had a normal hearing exam 2 months ago so this is new since then. Prior to Admission medications    Medication Sig Start Date End Date Taking? Authorizing Provider   sildenafil citrate (Viagra) 100 mg tablet Take 100 mg by mouth as needed for Erectile Dysfunction. Yes Provider, Historical   FLAXSEED OIL by Does Not Apply route daily. Yes Provider, Historical   triamcinolone acetonide (KENALOG) 0.025 % topical cream Apply  to affected area two (2) times a day. use thin layer 10/14/14  Yes Chidi Miramontes MD   crisaborole (EUCRISA) 2 % oint by Apply Externally route. Patient not taking: Reported on 7/9/2021    Provider, Historical         Physical Exam  HENT:      Ears:      Comments: R ear there was a clear hearing aide tip in the ear canal, this was removed w/ tweezers and w/o complications.   No discharge or erythema in the ear canal.  TM w/o erythema         Visit Vitals  /72   Pulse 85   Temp 97.3 °F (36.3 °C) (Temporal)   Resp 16   Ht 6' 2.5\" (1.892 m)   Wt 191 lb (86.6 kg)   SpO2 97%   BMI 24.19 kg/m²         Disclaimer:  Advised him to call back or return to office if symptoms worsen/change/persist.  Discussed expected course/resolution/complications of diagnosis in detail with patient. Medication risks/benefits/costs/interactions/alternatives discussed with patient. He was given an after visit summary which includes diagnoses, current medications, & vitals. He expressed understanding with the diagnosis and plan.       Mayda Black MD

## 2022-03-19 PROBLEM — N52.1 ERECTILE DYSFUNCTION DUE TO DISEASES CLASSIFIED ELSEWHERE: Status: ACTIVE | Noted: 2019-04-03

## 2022-08-25 ENCOUNTER — OFFICE VISIT (OUTPATIENT)
Dept: INTERNAL MEDICINE CLINIC | Age: 77
End: 2022-08-25
Payer: MEDICARE

## 2022-08-25 VITALS
WEIGHT: 190 LBS | SYSTOLIC BLOOD PRESSURE: 131 MMHG | DIASTOLIC BLOOD PRESSURE: 70 MMHG | BODY MASS INDEX: 23.62 KG/M2 | HEIGHT: 75 IN | TEMPERATURE: 97.8 F | HEART RATE: 77 BPM | RESPIRATION RATE: 16 BRPM | OXYGEN SATURATION: 97 %

## 2022-08-25 DIAGNOSIS — M25.572 ARTHRALGIA OF TOE OF LEFT FOOT: Primary | ICD-10-CM

## 2022-08-25 PROCEDURE — 1101F PT FALLS ASSESS-DOCD LE1/YR: CPT | Performed by: INTERNAL MEDICINE

## 2022-08-25 PROCEDURE — G8536 NO DOC ELDER MAL SCRN: HCPCS | Performed by: INTERNAL MEDICINE

## 2022-08-25 PROCEDURE — G0463 HOSPITAL OUTPT CLINIC VISIT: HCPCS | Performed by: INTERNAL MEDICINE

## 2022-08-25 PROCEDURE — G8510 SCR DEP NEG, NO PLAN REQD: HCPCS | Performed by: INTERNAL MEDICINE

## 2022-08-25 PROCEDURE — G8420 CALC BMI NORM PARAMETERS: HCPCS | Performed by: INTERNAL MEDICINE

## 2022-08-25 PROCEDURE — 99213 OFFICE O/P EST LOW 20 MIN: CPT | Performed by: INTERNAL MEDICINE

## 2022-08-25 PROCEDURE — G8427 DOCREV CUR MEDS BY ELIG CLIN: HCPCS | Performed by: INTERNAL MEDICINE

## 2022-08-25 NOTE — PROGRESS NOTES
Assessment/Plan:     1. Arthralgia of toe of left foot  -arthritis vs gout. Has mostly resolved. -recommended he continue use of ibuprofen 2 tab in the evening for another 5 days. May also continue epson salt soaks. Follow-up Disposition:       Disclaimer:  Return if symptoms worsen or fail to improve. Advised patient to call back or return to office if symptoms worsen/change/persist.     Discussed expected course/resolution/complications of diagnosis in detail with patient. Medication risks/benefits/costs/interactions/alternatives discussed with patient. Patient was given an after visit summary which includes diagnoses, current medications, & vitals. Patient expressed understanding with the diagnosis and plan. Subjective:      Michael Gomez is a 76 y.o. male who presents today for toe pain.       -having toe pain. Left second toe. No trauma. Pain started 5 days ago. Had pain with swelling. In the last 24 hours, it has improved. Took 2 ibuprofen tablets and soaked in epson salt and noted significant improvement the next day. He thinks it may be gout after looking it up on the computer. He has never had gout. He denies any dietary changes. Started when he was working out in his garden     Objective:       Visit Vitals  /70   Pulse 77   Temp 97.8 °F (36.6 °C)   Resp 16   Ht 6' 2.5\" (1.892 m)   Wt 190 lb (86.2 kg)   SpO2 97%   BMI 24.07 kg/m²     General appearance: alert, cooperative, no distress, appears stated age  Extremities: extremities normal, atraumatic, no cyanosis or edema. Slight redness of the tip of his left second toe. Arthritic changes noted in all toes. No tender to palpation. 02-Jul-2022 19:00

## 2022-08-25 NOTE — PROGRESS NOTES
Verified name and birth date for privacy precautions. Chart reviewed in preparation for today's visit. Chief Complaint   Patient presents with    Joint Pain          Health Maintenance Due   Topic    Hepatitis C Screening     DTaP/Tdap/Td series (1 - Tdap)    Pneumococcal 65+ years (2 - PCV)    Medicare Yearly Exam     COVID-19 Vaccine (4 - Booster for Pfizer series)    Depression Screen          Wt Readings from Last 3 Encounters:   08/25/22 190 lb (86.2 kg)   07/09/21 191 lb (86.6 kg)   04/30/21 192 lb 6.4 oz (87.3 kg)     Temp Readings from Last 3 Encounters:   08/25/22 97.8 °F (36.6 °C)   07/09/21 97.3 °F (36.3 °C) (Temporal)   04/30/21 98 °F (36.7 °C) (Temporal)     BP Readings from Last 3 Encounters:   08/25/22 131/70   07/09/21 118/72   04/30/21 130/88     Pulse Readings from Last 3 Encounters:   08/25/22 77   07/09/21 85   04/30/21 82         Learning Assessment:  :     Learning Assessment 4/3/2019 7/1/2014   PRIMARY LEARNER Patient Patient   HIGHEST LEVEL OF EDUCATION - PRIMARY LEARNER  > 4 YEARS OF COLLEGE > 4 YEARS OF COLLEGE   BARRIERS PRIMARY LEARNER - NONE   CO-LEARNER CAREGIVER - No   PRIMARY LANGUAGE ENGLISH ENGLISH    NEED - No   LEARNER PREFERENCE PRIMARY DEMONSTRATION READING     - DEMONSTRATION   LEARNING SPECIAL TOPICS - no   ANSWERED BY patient patient   RELATIONSHIP SELF SELF       Depression Screening:  :     3 most recent PHQ Screens 8/25/2022   Little interest or pleasure in doing things Not at all   Feeling down, depressed, irritable, or hopeless Not at all   Total Score PHQ 2 0       Fall Risk Assessment:  :     Fall Risk Assessment, last 12 mths 8/25/2022   Able to walk? Yes   Fall in past 12 months? 0   Do you feel unsteady? 0   Are you worried about falling 0   Number of falls in past 12 months -   Fall with injury? -       Abuse Screening:  :     Abuse Screening Questionnaire 8/25/2022 4/3/2019 3/7/2018 7/1/2014   Do you ever feel afraid of your partner?  N N N N   Are you in a relationship with someone who physically or mentally threatens you? N N N N   Is it safe for you to go home?  Val Baird

## 2022-10-07 ENCOUNTER — TELEPHONE (OUTPATIENT)
Dept: INTERNAL MEDICINE CLINIC | Age: 77
End: 2022-10-07

## 2022-10-07 NOTE — TELEPHONE ENCOUNTER
Called number and spoke with wife.  Patient is having issues that he was seen for in August. He would like to have a referral to podiatry

## 2022-10-07 NOTE — TELEPHONE ENCOUNTER
Reason for call:  Pt was calling in regards a refer for a doctor. symptoms are worse. Pt my chart it is not working.    Pt requested a call back     Is this a new problem: yes     Date of last appointment:  8/25/2022     Can we respond via [employer]: no    Best call back number: Jeff Guevara   [phone]

## 2022-11-22 ENCOUNTER — OFFICE VISIT (OUTPATIENT)
Dept: INTERNAL MEDICINE CLINIC | Age: 77
End: 2022-11-22
Payer: MEDICARE

## 2022-11-22 VITALS
SYSTOLIC BLOOD PRESSURE: 140 MMHG | HEART RATE: 76 BPM | HEIGHT: 75 IN | DIASTOLIC BLOOD PRESSURE: 90 MMHG | BODY MASS INDEX: 22.88 KG/M2 | TEMPERATURE: 97.6 F | WEIGHT: 184 LBS | RESPIRATION RATE: 16 BRPM | OXYGEN SATURATION: 97 %

## 2022-11-22 DIAGNOSIS — Z11.59 SCREENING FOR VIRAL DISEASE: ICD-10-CM

## 2022-11-22 DIAGNOSIS — Z00.00 MEDICARE ANNUAL WELLNESS VISIT, SUBSEQUENT: Primary | ICD-10-CM

## 2022-11-22 DIAGNOSIS — E78.2 MIXED HYPERLIPIDEMIA: ICD-10-CM

## 2022-11-22 DIAGNOSIS — Z13.31 SCREENING FOR DEPRESSION: ICD-10-CM

## 2022-11-22 PROCEDURE — 1101F PT FALLS ASSESS-DOCD LE1/YR: CPT | Performed by: INTERNAL MEDICINE

## 2022-11-22 PROCEDURE — G8427 DOCREV CUR MEDS BY ELIG CLIN: HCPCS | Performed by: INTERNAL MEDICINE

## 2022-11-22 PROCEDURE — G0439 PPPS, SUBSEQ VISIT: HCPCS | Performed by: INTERNAL MEDICINE

## 2022-11-22 PROCEDURE — G8510 SCR DEP NEG, NO PLAN REQD: HCPCS | Performed by: INTERNAL MEDICINE

## 2022-11-22 PROCEDURE — G8536 NO DOC ELDER MAL SCRN: HCPCS | Performed by: INTERNAL MEDICINE

## 2022-11-22 PROCEDURE — G8420 CALC BMI NORM PARAMETERS: HCPCS | Performed by: INTERNAL MEDICINE

## 2022-11-22 RX ORDER — DUPILUMAB 300 MG/2ML
300 INJECTION, SOLUTION SUBCUTANEOUS
COMMUNITY

## 2022-11-22 NOTE — PATIENT INSTRUCTIONS
Medicare Wellness Visit, Male    The best way to live healthy is to have a lifestyle where you eat a well-balanced diet, exercise regularly, limit alcohol use, and quit all forms of tobacco/nicotine, if applicable. Regular preventive services are another way to keep healthy. Preventive services (vaccines, screening tests, monitoring & exams) can help personalize your care plan, which helps you manage your own care. Screening tests can find health problems at the earliest stages, when they are easiest to treat. [facility] follows the current, evidence-based guidelines published by the [facility] Dave Nicole ([facility]) when recommending preventive services for our patients. Because we follow these guidelines, sometimes recommendations change over time as research supports it. (For example, a prostate screening blood test is no longer routinely recommended for men with no symptoms). Of course, you and your doctor may decide to screen more often for some diseases, based on your risk and co-morbidities (chronic disease you are already diagnosed with). Preventive services for you include:  - Medicare offers their members a free annual wellness visit, which is time for you and your primary care provider to discuss and plan for your preventive service needs. Take advantage of this benefit every year!  -All adults over age 72 should receive the recommended pneumonia vaccines. Current USPSTF guidelines recommend a series of two vaccines for the best pneumonia protection.   -All adults should have a flu vaccine yearly and tetanus vaccine every 10 years.  -All adults age 48 and older should receive the shingles vaccines (series of two vaccines).        -All adults age 38-68 who are overweight should have a diabetes screening test once every three years.   -Other screening tests & preventive services for persons with diabetes include: an eye exam to screen for diabetic retinopathy, a kidney function test, a foot exam, and stricter control over your cholesterol.   -Cardiovascular screening for adults with routine risk involves an electrocardiogram (ECG) at intervals determined by the provider.   -Colorectal cancer screening should be done for adults age 54-65 with no increased risk factors for colorectal cancer. There are a number of acceptable methods of screening for this type of cancer. Each test has its own benefits and drawbacks. Discuss with your provider what is most appropriate for you during your annual wellness visit. The different tests include: colonoscopy (considered the best screening method), a fecal occult blood test, a fecal DNA test, and sigmoidoscopy.  -All adults born between [facility] should be screened once for Hepatitis C.  -An Abdominal Aortic Aneurysm (AAA) Screening is recommended for men age 73-68 who has ever smoked in their lifetime.

## 2022-11-22 NOTE — PROGRESS NOTES
Assessment/Plan:     1. Medicare annual wellness visit, subsequent  -completed today.   -[facility] decision maker reviewed with patient and updated. - DEPRESSION SCREEN ANNUAL    2. Screening for depression    - DEPRESSION SCREEN ANNUAL    3. Mixed hyperlipidemia  -diet controlled. - CBC WITH AUTOMATED DIFF  - METABOLIC PANEL, COMPREHENSIVE  - LIPID PANEL  - URINALYSIS W/ REFLEX CULTURE    4. Screening for viral disease  - HEPATITIS C AB     5. Hammertoe  -following with podiatrist. Pain has improved with support. 6. Eczema  -following with dermatologist    Orders Placed This Encounter    ANNUAL DEPRESSION SCREEN 8-15 MIN    CBC WITH AUTOMATED DIFF    METABOLIC PANEL, COMPREHENSIVE    LIPID PANEL    URINALYSIS W/ REFLEX CULTURE    HEPATITIS C AB    MICROSCOPIC EXAMINATION    dupilumab (Dupixent Pen) 300 mg/2 mL pnij     Si mg by SubCUTAneous route every fourteen (14) days. Follow-up Disposition:     Follow up yearly               Subjective:      Ioana Holguin is a 76 y.o. male who presents today for his Medicare Wellness Exam     Since last visit :  -he saw Dr. Yolande Liu for his feft foot pain. He has a hammertoe of his second toe. Was given a support for his second toe, and the discomfort has resolved. -has had hernia repair, twice and he states that it has recurred. Right side. Last repair was 2014. He wants to avoid any more surgeries    -margret ulrich  Patient Care Team:  -Dr. Yolande Liu, podiatrist  -Dr. Fide Humphrey, dermatologist         Objective:      Wt Readings from Last 3 Encounters:   22 190 lb (86.2 kg)   21 191 lb (86.6 kg)   21 192 lb 6.4 oz (87.3 kg)     BP Readings from Last 3 Encounters:   22 131/70   21 118/72   21 130/88     Visit Vitals  BP (!) 140/90 (BP 1 Location: Left upper arm, BP Patient Position: Sitting, BP Cuff Size: Adult)   Pulse 76   Temp 97.6 °F (36.4 °C) (Temporal)   Resp 16   Ht 6' 2.75\" (1.899 m)   Wt 184 lb (83.5 kg)   SpO2 97%   BMI 23.15 kg/m²     General appearance: alert, cooperative, no distress, appears stated age  Head: Normocephalic, without obvious abnormality, atraumatic  Eyes: negative  Ears: normal TM's and external ear canals AU  Neck: supple, symmetrical, trachea midline, no adenopathy, no carotid bruit, and no JVD  Lungs: clear to auscultation bilaterally  Heart: regular rate and rhythm, S1, S2 normal, no murmur, click, rub or gallop  Abdomen: soft, non-tender. Bowel sounds normal. No masses,  no organomegaly  Extremities: extremities normal, atraumatic, no cyanosis or edema  Pulses: 2+ and symmetric  Skin: patch of dry, excoriated skin on palm of hands. Disclaimer:  Return if symptoms worsen or fail to improve. Advised patient to call back or return to office if symptoms worsen/change/persist.     Discussed expected course/resolution/complications of diagnosis in detail with patient. Medication risks/benefits/costs/interactions/alternatives discussed with patient. Patient was given an after visit summary which includes diagnoses, current medications, & vitals. Patient expressed understanding with the diagnosis and plan. This is the Subsequent Medicare Annual Wellness Exam, performed 12 months or more after the Initial AWV or the last Subsequent AWV    I have reviewed the patient's medical history in detail and updated the computerized patient record. Assessment/Plan   Education and counseling provided:  Are appropriate based on today's review and evaluation    1. Medicare annual wellness visit, subsequent  -     [facility]  2.  Screening for depression  -     DEPRESSION SCREEN ANNUAL       Depression Risk Factor Screening     3 most recent PHQ Screens 11/22/2022   Little interest or pleasure in doing things Not at all   Feeling down, depressed, irritable, or hopeless Not at all   Total Score PHQ 2 0       Alcohol & Drug Abuse Risk Screen   Do you average more than 1 drink per night or more than 7 drinks a week?: (P) No  In the past three months have you had more than 4 drinks containing alcohol on one occasion?: (P) No          Functional Ability and Level of Safety   Hearing:  Hearing: (P) Patient wears hearing aid  both sides. Activities of Daily Living: The home contains: (P) no safety equipment  Functional ADLs: (P) Patient does total self care   Ambulation:  Patient ambulates: (P) with no difficulty   Fall Risk:  Fall Risk Assessment, last 12 mths 11/22/2022   Able to walk? Yes   Fall in past 12 months? 0   Do you feel unsteady? 0   Are you worried about falling 0   Number of falls in past 12 months -   Fall with injury?  -     Abuse Screen:  Do you ever feel afraid of your partner?: (P) No  Are you in a relationship with someone who physically or mentally threatens you?: (P) No  Is it safe for you to go home?: (P) Yes      Cognitive Screening   Has your family/caregiver stated any concerns about your memory?: (P) No       Health Maintenance Due     Health Maintenance Due   Topic Date Due    Hepatitis C Screening  Never done    DTaP/Tdap/Td series (1 - Tdap) Never done    Flu Vaccine (1) 08/01/2022       Patient Care Team   Patient Care Team:  Fabian Garza MD as PCP - General (Internal Medicine Physician)  Fabian Garza MD as PCP - [facility] Empaneled Provider    History     Patient Active Problem List   Diagnosis Code    Inguinal hernia K40.90    Colon cancer screening Z12.11    Hemorrhoids K64.9    Eczema L30.9    Vitiligo L80    Erectile dysfunction due to diseases classified elsewhere N52.1     Past Medical History:   Diagnosis Date    ED (erectile dysfunction) 2007    Hearing reduced     Hemorrhoids     Ill-defined condition     IDIOPATHIC HIVES    Recurrent left inguinal hernia 2/10/2013    repaired 2/21/13    Recurrent right inguinal hernia 8/20/2014    Snoring     Squamous cell cancer of scalp and skin of neck 01/2019    Stroke ([facility] [address] 10/2014    POSSIBLE TIA; MRI DONE TODAY; Vertigo     Vitiligo       Past Surgical History:   Procedure Laterality Date    HX CHOLECYSTECTOMY  june 1982     Dr. Mirza Prudent    HX GI      HEMORRHOIDECTOMY    HX HEMORRHOIDECTOMY      HX HERNIA REPAIR  11-    Dr. Hassan Boeck    HX HERNIA REPAIR  12-28-10    Left inguinal indirect hernia and lipoma of cord-Dr. Marily Brandt HERNIA REPAIR  2-21-13    repair of recurrent left inguinal dugmvn-BCD-YiDr. Brayan Molina    HX HERNIA REPAIR  12-30-14    Excision of suture granuloma right inguinal region and repair of recurrent direct inguinal hernia with P&P-[facility]-DR. Brayan Molina    HX MALIGNANT SKIN LESION EXCISION  01/2019    squamous cell - back of neck - [facility] - Dr. Rafat Vaughn FX LEFT FOREARM W/PIN AND SCREW    HX OTHER SURGICAL      VASECTOMY    AL REMOVAL OF SPERM CORD LESION  12-28-10    Dr. Petersen Ice HERNIA,5+Y/O,REDUCIBL  12-    Dr. Brayan Molina     Current Outpatient Medications   Medication Sig Dispense Refill    dupilumab (Dupixent Pen) 300 mg/2 mL pnij 300 mg by SubCUTAneous route every fourteen (14) days. sildenafil citrate (VIAGRA) 100 mg tablet Take 100 mg by mouth as needed for Erectile Dysfunction. Allergies   Allergen Reactions    Amoxicillin Hives    Other Medication Other (comments)     PT HAD HIVES FOR 10 MONTHS AFTER ING HR WITH MESH; PT SUSPECTS MESH; HIVES MOSTLY ON ABDOMEN BUT ON LIMPBS & FACE AT TIMES.          Family History   Problem Relation Age of Onset    Stroke Mother         aneurysm - hemorrhagic stroke    Hypertension Father     Stroke Father         started age [de-identified]    Hypertension Sister     Cancer Sister         Bone    Cancer Daughter         MELANOMA    Anesth Problems Neg Hx      Social History     Tobacco Use    Smoking status: Never    Smokeless tobacco: Never   Substance Use Topics    Alcohol use: Yes     Comment: Yung Cheung MD

## 2022-11-23 LAB
ALBUMIN SERPL-MCNC: 4.4 G/DL (ref 3.7–4.7)
ALBUMIN/GLOB SERPL: 1.4 {RATIO} (ref 1.2–2.2)
ALP SERPL-CCNC: 93 IU/L (ref 44–121)
ALT SERPL-CCNC: 17 IU/L (ref 0–44)
APPEARANCE UR: CLEAR
AST SERPL-CCNC: 24 IU/L (ref 0–40)
BACTERIA #/AREA URNS HPF: NORMAL /[HPF]
BASOPHILS # BLD AUTO: 0.1 X10E3/UL (ref 0–0.2)
BASOPHILS NFR BLD AUTO: 1 %
BILIRUB SERPL-MCNC: 0.5 MG/DL (ref 0–1.2)
BILIRUB UR QL STRIP: NEGATIVE
BUN SERPL-MCNC: 27 MG/DL (ref 8–27)
BUN/CREAT SERPL: 20 (ref 10–24)
CALCIUM SERPL-MCNC: 9.6 MG/DL (ref 8.6–10.2)
CASTS URNS QL MICRO: NORMAL /LPF
CHLORIDE SERPL-SCNC: 102 MMOL/L (ref 96–106)
CHOLEST SERPL-MCNC: 220 MG/DL (ref 100–199)
CO2 SERPL-SCNC: 16 MMOL/L (ref 20–29)
COLOR UR: YELLOW
CREAT SERPL-MCNC: 1.33 MG/DL (ref 0.76–1.27)
EGFR: 54 ML/MIN/1.73
EOSINOPHIL # BLD AUTO: 0.2 X10E3/UL (ref 0–0.4)
EOSINOPHIL NFR BLD AUTO: 2 %
EPI CELLS #/AREA URNS HPF: NORMAL /HPF (ref 0–10)
ERYTHROCYTE [DISTWIDTH] IN BLOOD BY AUTOMATED COUNT: 12.1 % (ref 11.6–15.4)
GLOBULIN SER CALC-MCNC: 3.2 G/DL (ref 1.5–4.5)
GLUCOSE SERPL-MCNC: 91 MG/DL (ref 70–99)
GLUCOSE UR QL STRIP: NEGATIVE
HCT VFR BLD AUTO: 49.3 % (ref 37.5–51)
HCV AB S/CO SERPL IA: 0.3 S/CO RATIO (ref 0–0.9)
HDLC SERPL-MCNC: 63 MG/DL
HGB BLD-MCNC: 16.6 G/DL (ref 13–17.7)
HGB UR QL STRIP: NEGATIVE
IMM GRANULOCYTES # BLD AUTO: 0 X10E3/UL (ref 0–0.1)
IMM GRANULOCYTES NFR BLD AUTO: 1 %
KETONES UR QL STRIP: NEGATIVE
LDLC SERPL CALC-MCNC: 135 MG/DL (ref 0–99)
LEUKOCYTE ESTERASE UR QL STRIP: NEGATIVE
LYMPHOCYTES # BLD AUTO: 1.8 X10E3/UL (ref 0.7–3.1)
LYMPHOCYTES NFR BLD AUTO: 26 %
MCH RBC QN AUTO: 31.4 PG (ref 26.6–33)
MCHC RBC AUTO-ENTMCNC: 33.7 G/DL (ref 31.5–35.7)
MCV RBC AUTO: 93 FL (ref 79–97)
MICRO URNS: NORMAL
MICRO URNS: NORMAL
MONOCYTES # BLD AUTO: 0.7 X10E3/UL (ref 0.1–0.9)
MONOCYTES NFR BLD AUTO: 10 %
NEUTROPHILS # BLD AUTO: 4.1 X10E3/UL (ref 1.4–7)
NEUTROPHILS NFR BLD AUTO: 60 %
NITRITE UR QL STRIP: NEGATIVE
PH UR STRIP: 5.5 [PH] (ref 5–7.5)
PLATELET # BLD AUTO: 182 X10E3/UL (ref 150–450)
POTASSIUM SERPL-SCNC: 5.5 MMOL/L (ref 3.5–5.2)
PROT SERPL-MCNC: 7.6 G/DL (ref 6–8.5)
PROT UR QL STRIP: NEGATIVE
RBC # BLD AUTO: 5.28 X10E6/UL (ref 4.14–5.8)
RBC #/AREA URNS HPF: NORMAL /HPF (ref 0–2)
SODIUM SERPL-SCNC: 140 MMOL/L (ref 134–144)
SP GR UR STRIP: 1.02 (ref 1–1.03)
TRIGL SERPL-MCNC: 122 MG/DL (ref 0–149)
URINALYSIS REFLEX, 377202: NORMAL
UROBILINOGEN UR STRIP-MCNC: 0.2 MG/DL (ref 0.2–1)
VLDLC SERPL CALC-MCNC: 22 MG/DL (ref 5–40)
WBC # BLD AUTO: 6.8 X10E3/UL (ref 3.4–10.8)
WBC #/AREA URNS HPF: NORMAL /HPF (ref 0–5)

## 2022-11-26 PROBLEM — M20.42 HAMMER TOE OF LEFT FOOT: Status: ACTIVE | Noted: 2022-11-26

## 2023-01-01 ENCOUNTER — APPOINTMENT (OUTPATIENT)
Facility: HOSPITAL | Age: 78
DRG: 308 | End: 2023-01-01
Payer: MEDICARE

## 2023-01-01 ENCOUNTER — APPOINTMENT (OUTPATIENT)
Facility: HOSPITAL | Age: 78
DRG: 308 | End: 2023-01-01
Attending: INTERNAL MEDICINE
Payer: MEDICARE

## 2023-01-01 ENCOUNTER — HOSPITAL ENCOUNTER (INPATIENT)
Facility: HOSPITAL | Age: 78
LOS: 10 days | DRG: 308 | End: 2023-08-06
Attending: EMERGENCY MEDICINE | Admitting: INTERNAL MEDICINE
Payer: MEDICARE

## 2023-01-01 VITALS
HEIGHT: 74 IN | OXYGEN SATURATION: 86 % | BODY MASS INDEX: 23.23 KG/M2 | TEMPERATURE: 97.6 F | SYSTOLIC BLOOD PRESSURE: 31 MMHG | WEIGHT: 181 LBS | DIASTOLIC BLOOD PRESSURE: 19 MMHG

## 2023-01-01 DIAGNOSIS — I46.9 CARDIAC ARREST (HCC): Primary | ICD-10-CM

## 2023-01-01 DIAGNOSIS — R45.1 AGITATION: ICD-10-CM

## 2023-01-01 LAB
ALBUMIN SERPL-MCNC: 2.3 G/DL (ref 3.5–5)
ALBUMIN SERPL-MCNC: 2.4 G/DL (ref 3.5–5)
ALBUMIN SERPL-MCNC: 2.7 G/DL (ref 3.5–5)
ALBUMIN/GLOB SERPL: 0.5 (ref 1.1–2.2)
ALBUMIN/GLOB SERPL: 0.7 (ref 1.1–2.2)
ALBUMIN/GLOB SERPL: 0.7 (ref 1.1–2.2)
ALBUMIN/GLOB SERPL: 0.8 (ref 1.1–2.2)
ALBUMIN/GLOB SERPL: 0.8 (ref 1.1–2.2)
ALP SERPL-CCNC: 105 U/L (ref 45–117)
ALP SERPL-CCNC: 146 U/L (ref 45–117)
ALP SERPL-CCNC: 146 U/L (ref 45–117)
ALP SERPL-CCNC: 207 U/L (ref 45–117)
ALP SERPL-CCNC: 412 U/L (ref 45–117)
ALP SERPL-CCNC: 67 U/L (ref 45–117)
ALP SERPL-CCNC: 69 U/L (ref 45–117)
ALT SERPL-CCNC: 136 U/L (ref 12–78)
ALT SERPL-CCNC: 53 U/L (ref 12–78)
ALT SERPL-CCNC: 55 U/L (ref 12–78)
ALT SERPL-CCNC: 58 U/L (ref 12–78)
ALT SERPL-CCNC: 67 U/L (ref 12–78)
ALT SERPL-CCNC: 84 U/L (ref 12–78)
ALT SERPL-CCNC: 85 U/L (ref 12–78)
AMMONIA PLAS-SCNC: 21 UMOL/L
AMMONIA PLAS-SCNC: 52 UMOL/L
AMMONIA PLAS-SCNC: 56 UMOL/L
ANION GAP BLD CALC-SCNC: 17 (ref 10–20)
ANION GAP SERPL CALC-SCNC: 15 MMOL/L (ref 5–15)
ANION GAP SERPL CALC-SCNC: 4 MMOL/L (ref 5–15)
ANION GAP SERPL CALC-SCNC: 4 MMOL/L (ref 5–15)
ANION GAP SERPL CALC-SCNC: 5 MMOL/L (ref 5–15)
ANION GAP SERPL CALC-SCNC: 5 MMOL/L (ref 5–15)
ANION GAP SERPL CALC-SCNC: 6 MMOL/L (ref 5–15)
ANION GAP SERPL CALC-SCNC: 7 MMOL/L (ref 5–15)
ANION GAP SERPL CALC-SCNC: 9 MMOL/L (ref 5–15)
ARTERIAL PATENCY WRIST A: ABNORMAL
ARTERIAL PATENCY WRIST A: POSITIVE
AST SERPL-CCNC: 130 U/L (ref 15–37)
AST SERPL-CCNC: 41 U/L (ref 15–37)
AST SERPL-CCNC: 46 U/L (ref 15–37)
AST SERPL-CCNC: 48 U/L (ref 15–37)
AST SERPL-CCNC: 50 U/L (ref 15–37)
AST SERPL-CCNC: 66 U/L (ref 15–37)
AST SERPL-CCNC: 78 U/L (ref 15–37)
B PERT DNA SPEC QL NAA+PROBE: NOT DETECTED
BACTERIA SPEC CULT: NORMAL
BASE DEFICIT BLD-SCNC: 1 MMOL/L
BASE DEFICIT BLD-SCNC: 14 MMOL/L
BASE DEFICIT BLD-SCNC: 2.5 MMOL/L
BASE DEFICIT BLD-SCNC: 9.9 MMOL/L
BASE EXCESS BLD CALC-SCNC: 2.4 MMOL/L
BASE EXCESS BLD CALC-SCNC: 2.7 MMOL/L
BASOPHILS # BLD: 0 K/UL (ref 0–0.1)
BASOPHILS # BLD: 0.1 K/UL (ref 0–0.1)
BASOPHILS # BLD: 0.1 K/UL (ref 0–0.1)
BASOPHILS NFR BLD: 0 % (ref 0–1)
BASOPHILS NFR BLD: 1 % (ref 0–1)
BASOPHILS NFR BLD: 1 % (ref 0–1)
BDY SITE: ABNORMAL
BILIRUB DIRECT SERPL-MCNC: 2.3 MG/DL (ref 0–0.2)
BILIRUB SERPL-MCNC: 1 MG/DL (ref 0.2–1)
BILIRUB SERPL-MCNC: 1.1 MG/DL (ref 0.2–1)
BILIRUB SERPL-MCNC: 1.5 MG/DL (ref 0.2–1)
BILIRUB SERPL-MCNC: 1.7 MG/DL (ref 0.2–1)
BILIRUB SERPL-MCNC: 1.8 MG/DL (ref 0.2–1)
BILIRUB SERPL-MCNC: 2.3 MG/DL (ref 0.2–1)
BILIRUB SERPL-MCNC: 3.5 MG/DL (ref 0.2–1)
BORDETELLA PARAPERTUSSIS BY PCR: NOT DETECTED
BUN SERPL-MCNC: 22 MG/DL (ref 6–20)
BUN SERPL-MCNC: 23 MG/DL (ref 6–20)
BUN SERPL-MCNC: 26 MG/DL (ref 6–20)
BUN SERPL-MCNC: 29 MG/DL (ref 6–20)
BUN SERPL-MCNC: 30 MG/DL (ref 6–20)
BUN SERPL-MCNC: 33 MG/DL (ref 6–20)
BUN SERPL-MCNC: 35 MG/DL (ref 6–20)
BUN SERPL-MCNC: 45 MG/DL (ref 6–20)
BUN/CREAT SERPL: 16 (ref 12–20)
BUN/CREAT SERPL: 18 (ref 12–20)
BUN/CREAT SERPL: 20 (ref 12–20)
BUN/CREAT SERPL: 23 (ref 12–20)
BUN/CREAT SERPL: 25 (ref 12–20)
BUN/CREAT SERPL: 27 (ref 12–20)
BUN/CREAT SERPL: 27 (ref 12–20)
BUN/CREAT SERPL: 28 (ref 12–20)
C PNEUM DNA SPEC QL NAA+PROBE: NOT DETECTED
CA-I BLD-MCNC: 1.29 MMOL/L (ref 1.12–1.32)
CALCIUM SERPL-MCNC: 8.2 MG/DL (ref 8.5–10.1)
CALCIUM SERPL-MCNC: 8.3 MG/DL (ref 8.5–10.1)
CALCIUM SERPL-MCNC: 8.5 MG/DL (ref 8.5–10.1)
CALCIUM SERPL-MCNC: 8.8 MG/DL (ref 8.5–10.1)
CALCIUM SERPL-MCNC: 9.1 MG/DL (ref 8.5–10.1)
CHLORIDE BLD-SCNC: 109 MMOL/L (ref 100–108)
CHLORIDE SERPL-SCNC: 107 MMOL/L (ref 97–108)
CHLORIDE SERPL-SCNC: 107 MMOL/L (ref 97–108)
CHLORIDE SERPL-SCNC: 109 MMOL/L (ref 97–108)
CHLORIDE SERPL-SCNC: 110 MMOL/L (ref 97–108)
CHLORIDE SERPL-SCNC: 110 MMOL/L (ref 97–108)
CHLORIDE SERPL-SCNC: 111 MMOL/L (ref 97–108)
CHLORIDE SERPL-SCNC: 113 MMOL/L (ref 97–108)
CHLORIDE SERPL-SCNC: 116 MMOL/L (ref 97–108)
CK SERPL-CCNC: 191 U/L (ref 39–308)
CK SERPL-CCNC: 308 U/L (ref 39–308)
CO2 BLD-SCNC: 16 MMOL/L (ref 19–24)
CO2 SERPL-SCNC: 16 MMOL/L (ref 21–32)
CO2 SERPL-SCNC: 20 MMOL/L (ref 21–32)
CO2 SERPL-SCNC: 21 MMOL/L (ref 21–32)
CO2 SERPL-SCNC: 23 MMOL/L (ref 21–32)
CO2 SERPL-SCNC: 23 MMOL/L (ref 21–32)
CO2 SERPL-SCNC: 27 MMOL/L (ref 21–32)
CO2 SERPL-SCNC: 28 MMOL/L (ref 21–32)
CO2 SERPL-SCNC: 30 MMOL/L (ref 21–32)
COMMENT:: NORMAL
CREAT SERPL-MCNC: 0.99 MG/DL (ref 0.7–1.3)
CREAT SERPL-MCNC: 1.2 MG/DL (ref 0.7–1.3)
CREAT SERPL-MCNC: 1.22 MG/DL (ref 0.7–1.3)
CREAT SERPL-MCNC: 1.22 MG/DL (ref 0.7–1.3)
CREAT SERPL-MCNC: 1.28 MG/DL (ref 0.7–1.3)
CREAT SERPL-MCNC: 1.32 MG/DL (ref 0.7–1.3)
CREAT SERPL-MCNC: 1.68 MG/DL (ref 0.7–1.3)
CREAT SERPL-MCNC: 1.86 MG/DL (ref 0.7–1.3)
CREAT UR-MCNC: 1.1 MG/DL (ref 0.6–1.3)
DIFFERENTIAL METHOD BLD: ABNORMAL
DIFFERENTIAL METHOD BLD: NORMAL
ECHO AO ASC DIAM: 3.8 CM
ECHO AO ASCENDING AORTA INDEX: 1.79 CM/M2
ECHO AO ROOT DIAM: 3.8 CM
ECHO AO ROOT INDEX: 1.79 CM/M2
ECHO AR MAX VEL PISA: 3.9 M/S
ECHO AV ACCELERATION TIME: 73.26 MS
ECHO AV AREA PEAK VELOCITY: 1.7 CM2
ECHO AV AREA VTI: 1.8 CM2
ECHO AV AREA/BSA PEAK VELOCITY: 0.8 CM2/M2
ECHO AV AREA/BSA VTI: 0.8 CM2/M2
ECHO AV MEAN GRADIENT: 19 MMHG
ECHO AV MEAN VELOCITY: 2.1 M/S
ECHO AV PEAK GRADIENT: 30 MMHG
ECHO AV PEAK VELOCITY: 2.8 M/S
ECHO AV REGURGITANT PHT: 372.5 MILLISECOND
ECHO AV VELOCITY RATIO: 0.39
ECHO AV VTI: 53.4 CM
ECHO BSA: 2.11 M2
ECHO EST RA PRESSURE: 15 MMHG
ECHO LA DIAMETER INDEX: 1.89 CM/M2
ECHO LA DIAMETER: 4 CM
ECHO LA TO AORTIC ROOT RATIO: 1.05
ECHO LA VOL 2C: 88 ML (ref 18–58)
ECHO LA VOL 2C: 95 ML (ref 18–58)
ECHO LA VOL 4C: 90 ML (ref 18–58)
ECHO LA VOL 4C: 97 ML (ref 18–58)
ECHO LA VOLUME AREA LENGTH: 96 ML
ECHO LA VOLUME INDEX AREA LENGTH: 45 ML/M2 (ref 16–34)
ECHO LV E' LATERAL VELOCITY: 9 CM/S
ECHO LV E' SEPTAL VELOCITY: 7 CM/S
ECHO LV EDV A2C: 110 ML
ECHO LV EDV A4C: 146 ML
ECHO LV EDV BP: 132 ML (ref 67–155)
ECHO LV EDV INDEX A4C: 69 ML/M2
ECHO LV EDV INDEX BP: 62 ML/M2
ECHO LV EDV NDEX A2C: 52 ML/M2
ECHO LV EJECTION FRACTION A2C: 66 %
ECHO LV EJECTION FRACTION A4C: 44 %
ECHO LV EJECTION FRACTION BIPLANE: 58 % (ref 55–100)
ECHO LV ESV A2C: 37 ML
ECHO LV ESV A4C: 82 ML
ECHO LV ESV BP: 55 ML (ref 22–58)
ECHO LV ESV INDEX A2C: 17 ML/M2
ECHO LV ESV INDEX A4C: 39 ML/M2
ECHO LV ESV INDEX BP: 26 ML/M2
ECHO LV FRACTIONAL SHORTENING: 27 % (ref 28–44)
ECHO LV INTERNAL DIMENSION DIASTOLE INDEX: 2.59 CM/M2
ECHO LV INTERNAL DIMENSION DIASTOLIC: 5.5 CM (ref 4.2–5.9)
ECHO LV INTERNAL DIMENSION SYSTOLIC INDEX: 1.89 CM/M2
ECHO LV INTERNAL DIMENSION SYSTOLIC: 4 CM
ECHO LV IVSD: 0.8 CM (ref 0.6–1)
ECHO LV MASS 2D: 185.8 G (ref 88–224)
ECHO LV MASS INDEX 2D: 87.7 G/M2 (ref 49–115)
ECHO LV POSTERIOR WALL DIASTOLIC: 1 CM (ref 0.6–1)
ECHO LV RELATIVE WALL THICKNESS RATIO: 0.36
ECHO LVOT AREA: 4.5 CM2
ECHO LVOT AV VTI INDEX: 0.4
ECHO LVOT DIAM: 2.4 CM
ECHO LVOT MEAN GRADIENT: 2 MMHG
ECHO LVOT PEAK GRADIENT: 5 MMHG
ECHO LVOT PEAK VELOCITY: 1.1 M/S
ECHO LVOT STROKE VOLUME INDEX: 45 ML/M2
ECHO LVOT SV: 95.4 ML
ECHO LVOT VTI: 21.1 CM
ECHO MV A VELOCITY: 1.23 M/S
ECHO MV AREA VTI: 3 CM2
ECHO MV E DECELERATION TIME (DT): 278.6 MS
ECHO MV E VELOCITY: 0.83 M/S
ECHO MV E/A RATIO: 0.67
ECHO MV E/E' LATERAL: 9.22
ECHO MV E/E' RATIO (AVERAGED): 10.54
ECHO MV E/E' SEPTAL: 11.86
ECHO MV LVOT VTI INDEX: 1.48
ECHO MV MAX VELOCITY: 1.5 M/S
ECHO MV MEAN GRADIENT: 4 MMHG
ECHO MV MEAN VELOCITY: 1 M/S
ECHO MV PEAK GRADIENT: 8 MMHG
ECHO MV PRESSURE HALF TIME (PHT): 42.7 MS
ECHO MV PRESSURE HALF TIME (PHT): 80.8 MS
ECHO MV REGURGITANT PEAK GRADIENT: 125 MMHG
ECHO MV REGURGITANT PEAK VELOCITY: 5.6 M/S
ECHO MV VTI: 31.3 CM
ECHO PULMONARY ARTERY END DIASTOLIC PRESSURE: 6 MMHG
ECHO PV MAX VELOCITY: 1.2 M/S
ECHO PV PEAK GRADIENT: 6 MMHG
ECHO PV REGURGITANT MAX VELOCITY: 1.2 M/S
ECHO RIGHT VENTRICULAR SYSTOLIC PRESSURE (RVSP): 53 MMHG
ECHO RV MID DIMENSION: 4.6 CM
ECHO RV TAPSE: 2.5 CM (ref 1.7–?)
ECHO TV REGURGITANT MAX VELOCITY: 3.1 M/S
ECHO TV REGURGITANT PEAK GRADIENT: 39 MMHG
EKG ATRIAL RATE: 113 BPM
EKG DIAGNOSIS: NORMAL
EKG P-R INTERVAL: 200 MS
EKG Q-T INTERVAL: 300 MS
EKG QRS DURATION: 134 MS
EKG QTC CALCULATION (BAZETT): 426 MS
EKG R AXIS: -40 DEGREES
EKG T AXIS: 135 DEGREES
EKG VENTRICULAR RATE: 121 BPM
EOSINOPHIL # BLD: 0 K/UL (ref 0–0.4)
EOSINOPHIL # BLD: 0.1 K/UL (ref 0–0.4)
EOSINOPHIL # BLD: 0.3 K/UL (ref 0–0.4)
EOSINOPHIL # BLD: 0.3 K/UL (ref 0–0.4)
EOSINOPHIL NFR BLD: 0 % (ref 0–7)
EOSINOPHIL NFR BLD: 1 % (ref 0–7)
EOSINOPHIL NFR BLD: 3 % (ref 0–7)
EOSINOPHIL NFR BLD: 4 % (ref 0–7)
ERYTHROCYTE [DISTWIDTH] IN BLOOD BY AUTOMATED COUNT: 12.3 % (ref 11.5–14.5)
ERYTHROCYTE [DISTWIDTH] IN BLOOD BY AUTOMATED COUNT: 12.5 % (ref 11.5–14.5)
ERYTHROCYTE [DISTWIDTH] IN BLOOD BY AUTOMATED COUNT: 12.6 % (ref 11.5–14.5)
ERYTHROCYTE [DISTWIDTH] IN BLOOD BY AUTOMATED COUNT: 12.7 % (ref 11.5–14.5)
ERYTHROCYTE [DISTWIDTH] IN BLOOD BY AUTOMATED COUNT: 12.7 % (ref 11.5–14.5)
ERYTHROCYTE [DISTWIDTH] IN BLOOD BY AUTOMATED COUNT: 13 % (ref 11.5–14.5)
ERYTHROCYTE [DISTWIDTH] IN BLOOD BY AUTOMATED COUNT: 13.2 % (ref 11.5–14.5)
ERYTHROCYTE [DISTWIDTH] IN BLOOD BY AUTOMATED COUNT: 13.3 % (ref 11.5–14.5)
FLUAV SUBTYP SPEC NAA+PROBE: NOT DETECTED
FLUBV RNA SPEC QL NAA+PROBE: NOT DETECTED
GAS FLOW.O2 O2 DELIVERY SYS: ABNORMAL
GAS FLOW.O2 SETTING OXYMISER: 15 BPM
GAS FLOW.O2 SETTING OXYMISER: 18 BPM
GAS FLOW.O2 SETTING OXYMISER: 20 BPM
GLOBULIN SER CALC-MCNC: 3.2 G/DL (ref 2–4)
GLOBULIN SER CALC-MCNC: 3.3 G/DL (ref 2–4)
GLOBULIN SER CALC-MCNC: 3.4 G/DL (ref 2–4)
GLOBULIN SER CALC-MCNC: 3.6 G/DL (ref 2–4)
GLOBULIN SER CALC-MCNC: 4.4 G/DL (ref 2–4)
GLOBULIN SER CALC-MCNC: 4.4 G/DL (ref 2–4)
GLOBULIN SER CALC-MCNC: 4.5 G/DL (ref 2–4)
GLUCOSE BLD STRIP.AUTO-MCNC: 100 MG/DL (ref 65–117)
GLUCOSE BLD STRIP.AUTO-MCNC: 101 MG/DL (ref 65–117)
GLUCOSE BLD STRIP.AUTO-MCNC: 101 MG/DL (ref 65–117)
GLUCOSE BLD STRIP.AUTO-MCNC: 102 MG/DL (ref 65–117)
GLUCOSE BLD STRIP.AUTO-MCNC: 102 MG/DL (ref 65–117)
GLUCOSE BLD STRIP.AUTO-MCNC: 103 MG/DL (ref 65–117)
GLUCOSE BLD STRIP.AUTO-MCNC: 103 MG/DL (ref 65–117)
GLUCOSE BLD STRIP.AUTO-MCNC: 104 MG/DL (ref 65–117)
GLUCOSE BLD STRIP.AUTO-MCNC: 104 MG/DL (ref 65–117)
GLUCOSE BLD STRIP.AUTO-MCNC: 105 MG/DL (ref 65–117)
GLUCOSE BLD STRIP.AUTO-MCNC: 105 MG/DL (ref 65–117)
GLUCOSE BLD STRIP.AUTO-MCNC: 107 MG/DL (ref 65–117)
GLUCOSE BLD STRIP.AUTO-MCNC: 108 MG/DL (ref 65–117)
GLUCOSE BLD STRIP.AUTO-MCNC: 108 MG/DL (ref 65–117)
GLUCOSE BLD STRIP.AUTO-MCNC: 109 MG/DL (ref 65–117)
GLUCOSE BLD STRIP.AUTO-MCNC: 109 MG/DL (ref 65–117)
GLUCOSE BLD STRIP.AUTO-MCNC: 112 MG/DL (ref 65–117)
GLUCOSE BLD STRIP.AUTO-MCNC: 112 MG/DL (ref 65–117)
GLUCOSE BLD STRIP.AUTO-MCNC: 113 MG/DL (ref 65–117)
GLUCOSE BLD STRIP.AUTO-MCNC: 117 MG/DL (ref 65–117)
GLUCOSE BLD STRIP.AUTO-MCNC: 118 MG/DL (ref 65–117)
GLUCOSE BLD STRIP.AUTO-MCNC: 120 MG/DL (ref 65–117)
GLUCOSE BLD STRIP.AUTO-MCNC: 120 MG/DL (ref 65–117)
GLUCOSE BLD STRIP.AUTO-MCNC: 123 MG/DL (ref 65–117)
GLUCOSE BLD STRIP.AUTO-MCNC: 124 MG/DL (ref 65–117)
GLUCOSE BLD STRIP.AUTO-MCNC: 126 MG/DL (ref 65–117)
GLUCOSE BLD STRIP.AUTO-MCNC: 128 MG/DL (ref 65–117)
GLUCOSE BLD STRIP.AUTO-MCNC: 129 MG/DL (ref 65–117)
GLUCOSE BLD STRIP.AUTO-MCNC: 133 MG/DL (ref 65–117)
GLUCOSE BLD STRIP.AUTO-MCNC: 144 MG/DL (ref 65–117)
GLUCOSE BLD STRIP.AUTO-MCNC: 246 MG/DL (ref 74–106)
GLUCOSE BLD STRIP.AUTO-MCNC: 92 MG/DL (ref 65–117)
GLUCOSE BLD STRIP.AUTO-MCNC: 96 MG/DL (ref 65–117)
GLUCOSE BLD STRIP.AUTO-MCNC: 98 MG/DL (ref 65–117)
GLUCOSE BLD STRIP.AUTO-MCNC: 99 MG/DL (ref 65–117)
GLUCOSE SERPL-MCNC: 102 MG/DL (ref 65–100)
GLUCOSE SERPL-MCNC: 108 MG/DL (ref 65–100)
GLUCOSE SERPL-MCNC: 109 MG/DL (ref 65–100)
GLUCOSE SERPL-MCNC: 117 MG/DL (ref 65–100)
GLUCOSE SERPL-MCNC: 137 MG/DL (ref 65–100)
GLUCOSE SERPL-MCNC: 143 MG/DL (ref 65–100)
GLUCOSE SERPL-MCNC: 163 MG/DL (ref 65–100)
GLUCOSE SERPL-MCNC: 253 MG/DL (ref 65–100)
GRAM STN SPEC: NORMAL
HADV DNA SPEC QL NAA+PROBE: NOT DETECTED
HCO3 BLD-SCNC: 17.3 MMOL/L (ref 22–26)
HCO3 BLD-SCNC: 21.3 MMOL/L (ref 22–26)
HCO3 BLD-SCNC: 22.2 MMOL/L (ref 22–26)
HCO3 BLD-SCNC: 24.3 MMOL/L (ref 22–26)
HCO3 BLD-SCNC: 27.6 MMOL/L (ref 22–26)
HCO3 BLDA-SCNC: 16 MMOL/L
HCOV 229E RNA SPEC QL NAA+PROBE: NOT DETECTED
HCOV HKU1 RNA SPEC QL NAA+PROBE: NOT DETECTED
HCOV NL63 RNA SPEC QL NAA+PROBE: NOT DETECTED
HCOV OC43 RNA SPEC QL NAA+PROBE: NOT DETECTED
HCT VFR BLD AUTO: 38.6 % (ref 36.6–50.3)
HCT VFR BLD AUTO: 40.5 % (ref 36.6–50.3)
HCT VFR BLD AUTO: 40.9 % (ref 36.6–50.3)
HCT VFR BLD AUTO: 42.7 % (ref 36.6–50.3)
HCT VFR BLD AUTO: 42.9 % (ref 36.6–50.3)
HCT VFR BLD AUTO: 43 % (ref 36.6–50.3)
HCT VFR BLD AUTO: 43.5 % (ref 36.6–50.3)
HCT VFR BLD AUTO: 48.4 % (ref 36.6–50.3)
HGB BLD-MCNC: 12.7 G/DL (ref 12.1–17)
HGB BLD-MCNC: 13.3 G/DL (ref 12.1–17)
HGB BLD-MCNC: 13.3 G/DL (ref 12.1–17)
HGB BLD-MCNC: 14 G/DL (ref 12.1–17)
HGB BLD-MCNC: 14.2 G/DL (ref 12.1–17)
HGB BLD-MCNC: 14.2 G/DL (ref 12.1–17)
HGB BLD-MCNC: 14.4 G/DL (ref 12.1–17)
HGB BLD-MCNC: 15.4 G/DL (ref 12.1–17)
HMPV RNA SPEC QL NAA+PROBE: NOT DETECTED
HPIV1 RNA SPEC QL NAA+PROBE: NOT DETECTED
HPIV2 RNA SPEC QL NAA+PROBE: NOT DETECTED
HPIV3 RNA SPEC QL NAA+PROBE: NOT DETECTED
HPIV4 RNA SPEC QL NAA+PROBE: NOT DETECTED
IMM GRANULOCYTES # BLD AUTO: 0 K/UL
IMM GRANULOCYTES # BLD AUTO: 0.1 K/UL (ref 0–0.04)
IMM GRANULOCYTES # BLD AUTO: 0.2 K/UL (ref 0–0.04)
IMM GRANULOCYTES NFR BLD AUTO: 0 %
IMM GRANULOCYTES NFR BLD AUTO: 1 % (ref 0–0.5)
IMM GRANULOCYTES NFR BLD AUTO: 2 % (ref 0–0.5)
LACTATE BLD-SCNC: 13.23 MMOL/L (ref 0.4–2)
LACTATE SERPL-SCNC: 1.3 MMOL/L (ref 0.4–2)
LACTATE SERPL-SCNC: 1.6 MMOL/L (ref 0.4–2)
LACTATE SERPL-SCNC: 1.8 MMOL/L (ref 0.4–2)
LACTATE SERPL-SCNC: 13.4 MMOL/L (ref 0.4–2)
LYMPHOCYTES # BLD: 0.8 K/UL (ref 0.8–3.5)
LYMPHOCYTES # BLD: 1 K/UL (ref 0.8–3.5)
LYMPHOCYTES # BLD: 1.2 K/UL (ref 0.8–3.5)
LYMPHOCYTES # BLD: 1.9 K/UL (ref 0.8–3.5)
LYMPHOCYTES NFR BLD: 12 % (ref 12–49)
LYMPHOCYTES NFR BLD: 13 % (ref 12–49)
LYMPHOCYTES NFR BLD: 17 % (ref 12–49)
LYMPHOCYTES NFR BLD: 6 % (ref 12–49)
LYMPHOCYTES NFR BLD: 7 % (ref 12–49)
LYMPHOCYTES NFR BLD: 8 % (ref 12–49)
M PNEUMO DNA SPEC QL NAA+PROBE: NOT DETECTED
MAGNESIUM SERPL-MCNC: 2.1 MG/DL (ref 1.6–2.4)
MAGNESIUM SERPL-MCNC: 2.2 MG/DL (ref 1.6–2.4)
MAGNESIUM SERPL-MCNC: 2.4 MG/DL (ref 1.6–2.4)
MAGNESIUM SERPL-MCNC: 2.4 MG/DL (ref 1.6–2.4)
MCH RBC QN AUTO: 30.9 PG (ref 26–34)
MCH RBC QN AUTO: 31 PG (ref 26–34)
MCH RBC QN AUTO: 31.2 PG (ref 26–34)
MCH RBC QN AUTO: 31.2 PG (ref 26–34)
MCH RBC QN AUTO: 31.6 PG (ref 26–34)
MCH RBC QN AUTO: 31.9 PG (ref 26–34)
MCHC RBC AUTO-ENTMCNC: 31.8 G/DL (ref 30–36.5)
MCHC RBC AUTO-ENTMCNC: 32.5 G/DL (ref 30–36.5)
MCHC RBC AUTO-ENTMCNC: 32.6 G/DL (ref 30–36.5)
MCHC RBC AUTO-ENTMCNC: 32.6 G/DL (ref 30–36.5)
MCHC RBC AUTO-ENTMCNC: 32.8 G/DL (ref 30–36.5)
MCHC RBC AUTO-ENTMCNC: 32.9 G/DL (ref 30–36.5)
MCHC RBC AUTO-ENTMCNC: 33.3 G/DL (ref 30–36.5)
MCHC RBC AUTO-ENTMCNC: 33.5 G/DL (ref 30–36.5)
MCV RBC AUTO: 93.2 FL (ref 80–99)
MCV RBC AUTO: 94.2 FL (ref 80–99)
MCV RBC AUTO: 95.1 FL (ref 80–99)
MCV RBC AUTO: 95.5 FL (ref 80–99)
MCV RBC AUTO: 96 FL (ref 80–99)
MCV RBC AUTO: 96.9 FL (ref 80–99)
MCV RBC AUTO: 97.1 FL (ref 80–99)
MCV RBC AUTO: 98.2 FL (ref 80–99)
MONOCYTES # BLD: 0.9 K/UL (ref 0–1)
MONOCYTES # BLD: 1 K/UL (ref 0–1)
MONOCYTES # BLD: 1.1 K/UL (ref 0–1)
MONOCYTES # BLD: 1.2 K/UL (ref 0–1)
MONOCYTES NFR BLD: 10 % (ref 5–13)
MONOCYTES NFR BLD: 10 % (ref 5–13)
MONOCYTES NFR BLD: 12 % (ref 5–13)
MONOCYTES NFR BLD: 12 % (ref 5–13)
MONOCYTES NFR BLD: 6 % (ref 5–13)
MONOCYTES NFR BLD: 9 % (ref 5–13)
NEUTS SEG # BLD: 11.6 K/UL (ref 1.8–8)
NEUTS SEG # BLD: 5.5 K/UL (ref 1.8–8)
NEUTS SEG # BLD: 6.6 K/UL (ref 1.8–8)
NEUTS SEG # BLD: 7.6 K/UL (ref 1.8–8)
NEUTS SEG # BLD: 7.8 K/UL (ref 1.8–8)
NEUTS SEG # BLD: 9.7 K/UL (ref 1.8–8)
NEUTS SEG NFR BLD: 69 % (ref 32–75)
NEUTS SEG NFR BLD: 71 % (ref 32–75)
NEUTS SEG NFR BLD: 72 % (ref 32–75)
NEUTS SEG NFR BLD: 81 % (ref 32–75)
NEUTS SEG NFR BLD: 82 % (ref 32–75)
NEUTS SEG NFR BLD: 87 % (ref 32–75)
NRBC # BLD: 0 K/UL (ref 0–0.01)
NRBC BLD-RTO: 0 PER 100 WBC
NT PRO BNP: 2042 PG/ML
NT PRO BNP: 3966 PG/ML
NT PRO BNP: 4207 PG/ML
O2/TOTAL GAS SETTING VFR VENT: 100 %
O2/TOTAL GAS SETTING VFR VENT: 6 %
O2/TOTAL GAS SETTING VFR VENT: 60 %
O2/TOTAL GAS SETTING VFR VENT: 85 %
PCO2 BLD: 29.8 MMHG (ref 35–45)
PCO2 BLD: 32 MMHG (ref 35–45)
PCO2 BLD: 33.8 MMHG (ref 35–45)
PCO2 BLD: 41.5 MMHG (ref 35–45)
PCO2 BLD: 42.8 MMHG (ref 35–45)
PCO2 BLDV: 48.4 MMHG (ref 41–51)
PEEP RESPIRATORY: 5 CMH2O
PEEP RESPIRATORY: 8 CMH2O
PH BLD: 7.23 (ref 7.35–7.45)
PH BLD: 7.41 (ref 7.35–7.45)
PH BLD: 7.42 (ref 7.35–7.45)
PH BLD: 7.45 (ref 7.35–7.45)
PH BLD: 7.52 (ref 7.35–7.45)
PH BLDV: 7.12 (ref 7.32–7.42)
PHOSPHATE SERPL-MCNC: 1.7 MG/DL (ref 2.6–4.7)
PHOSPHATE SERPL-MCNC: 2.4 MG/DL (ref 2.6–4.7)
PLATELET # BLD AUTO: 115 K/UL (ref 150–400)
PLATELET # BLD AUTO: 142 K/UL (ref 150–400)
PLATELET # BLD AUTO: 181 K/UL (ref 150–400)
PLATELET # BLD AUTO: 183 K/UL (ref 150–400)
PLATELET # BLD AUTO: 189 K/UL (ref 150–400)
PLATELET # BLD AUTO: 310 K/UL (ref 150–400)
PLATELET # BLD AUTO: 331 K/UL (ref 150–400)
PLATELET # BLD AUTO: 94 K/UL (ref 150–400)
PMV BLD AUTO: 10.2 FL (ref 8.9–12.9)
PMV BLD AUTO: 10.4 FL (ref 8.9–12.9)
PMV BLD AUTO: 11.1 FL (ref 8.9–12.9)
PMV BLD AUTO: 11.2 FL (ref 8.9–12.9)
PMV BLD AUTO: 11.4 FL (ref 8.9–12.9)
PMV BLD AUTO: 11.8 FL (ref 8.9–12.9)
PMV BLD AUTO: 11.9 FL (ref 8.9–12.9)
PO2 BLD: 141 MMHG (ref 80–100)
PO2 BLD: 151 MMHG (ref 80–100)
PO2 BLD: 171 MMHG (ref 80–100)
PO2 BLD: 43 MMHG (ref 80–100)
PO2 BLD: 76 MMHG (ref 80–100)
PO2 BLDV: 58 MMHG (ref 25–40)
POTASSIUM BLD-SCNC: 4.1 MMOL/L (ref 3.5–5.5)
POTASSIUM SERPL-SCNC: 3.5 MMOL/L (ref 3.5–5.1)
POTASSIUM SERPL-SCNC: 3.6 MMOL/L (ref 3.5–5.1)
POTASSIUM SERPL-SCNC: 3.6 MMOL/L (ref 3.5–5.1)
POTASSIUM SERPL-SCNC: 3.8 MMOL/L (ref 3.5–5.1)
POTASSIUM SERPL-SCNC: 4 MMOL/L (ref 3.5–5.1)
POTASSIUM SERPL-SCNC: 4.2 MMOL/L (ref 3.5–5.1)
POTASSIUM SERPL-SCNC: 4.3 MMOL/L (ref 3.5–5.1)
POTASSIUM SERPL-SCNC: 4.8 MMOL/L (ref 3.5–5.1)
PROCALCITONIN SERPL-MCNC: 1.05 NG/ML
PROCALCITONIN SERPL-MCNC: 1.19 NG/ML
PROT SERPL-MCNC: 5.6 G/DL (ref 6.4–8.2)
PROT SERPL-MCNC: 5.7 G/DL (ref 6.4–8.2)
PROT SERPL-MCNC: 6 G/DL (ref 6.4–8.2)
PROT SERPL-MCNC: 6 G/DL (ref 6.4–8.2)
PROT SERPL-MCNC: 6.8 G/DL (ref 6.4–8.2)
PROT SERPL-MCNC: 6.8 G/DL (ref 6.4–8.2)
PROT SERPL-MCNC: 6.9 G/DL (ref 6.4–8.2)
RBC # BLD AUTO: 4.02 M/UL (ref 4.1–5.7)
RBC # BLD AUTO: 4.21 M/UL (ref 4.1–5.7)
RBC # BLD AUTO: 4.3 M/UL (ref 4.1–5.7)
RBC # BLD AUTO: 4.49 M/UL (ref 4.1–5.7)
RBC # BLD AUTO: 4.49 M/UL (ref 4.1–5.7)
RBC # BLD AUTO: 4.52 M/UL (ref 4.1–5.7)
RBC # BLD AUTO: 4.58 M/UL (ref 4.1–5.7)
RBC # BLD AUTO: 4.93 M/UL (ref 4.1–5.7)
RBC MORPH BLD: ABNORMAL
RBC MORPH BLD: ABNORMAL
RBC MORPH BLD: NORMAL
RSV RNA SPEC QL NAA+PROBE: NOT DETECTED
RV+EV RNA SPEC QL NAA+PROBE: NOT DETECTED
SAO2 % BLD: 79 %
SAO2 % BLD: 84.3 % (ref 92–97)
SAO2 % BLD: 95.2 % (ref 92–97)
SAO2 % BLD: 98.9 % (ref 92–97)
SAO2 % BLD: 99.3 % (ref 92–97)
SAO2 % BLD: 99.6 % (ref 92–97)
SARS-COV-2 RNA RESP QL NAA+PROBE: NOT DETECTED
SERVICE CMNT-IMP: ABNORMAL
SERVICE CMNT-IMP: NORMAL
SODIUM BLD-SCNC: 142 MMOL/L (ref 136–145)
SODIUM SERPL-SCNC: 134 MMOL/L (ref 136–145)
SODIUM SERPL-SCNC: 136 MMOL/L (ref 136–145)
SODIUM SERPL-SCNC: 137 MMOL/L (ref 136–145)
SODIUM SERPL-SCNC: 138 MMOL/L (ref 136–145)
SODIUM SERPL-SCNC: 142 MMOL/L (ref 136–145)
SODIUM SERPL-SCNC: 143 MMOL/L (ref 136–145)
SODIUM SERPL-SCNC: 148 MMOL/L (ref 136–145)
SODIUM SERPL-SCNC: 148 MMOL/L (ref 136–145)
SODIUM UR-SCNC: <5 MMOL/L
SPECIMEN HOLD: NORMAL
SPECIMEN SITE: ABNORMAL
SPECIMEN TYPE: ABNORMAL
TROPONIN I SERPL HS-MCNC: 22 NG/L (ref 0–76)
TROPONIN I SERPL HS-MCNC: 405 NG/L (ref 0–76)
TROPONIN I SERPL HS-MCNC: 910 NG/L (ref 0–76)
VENTILATION MODE VENT: ABNORMAL
VT SETTING VENT: 500 ML
VT SETTING VENT: 500 ML
VT SETTING VENT: 550 ML
WBC # BLD AUTO: 10.1 K/UL (ref 4.1–11.1)
WBC # BLD AUTO: 11 K/UL (ref 4.1–11.1)
WBC # BLD AUTO: 11.8 K/UL (ref 4.1–11.1)
WBC # BLD AUTO: 13.5 K/UL (ref 4.1–11.1)
WBC # BLD AUTO: 7.7 K/UL (ref 4.1–11.1)
WBC # BLD AUTO: 9.3 K/UL (ref 4.1–11.1)
WBC # BLD AUTO: 9.4 K/UL (ref 4.1–11.1)
WBC # BLD AUTO: 9.5 K/UL (ref 4.1–11.1)

## 2023-01-01 PROCEDURE — 85025 COMPLETE CBC W/AUTO DIFF WBC: CPT

## 2023-01-01 PROCEDURE — 2580000003 HC RX 258: Performed by: INTERNAL MEDICINE

## 2023-01-01 PROCEDURE — 84300 ASSAY OF URINE SODIUM: CPT

## 2023-01-01 PROCEDURE — 82803 BLOOD GASES ANY COMBINATION: CPT

## 2023-01-01 PROCEDURE — 87040 BLOOD CULTURE FOR BACTERIA: CPT

## 2023-01-01 PROCEDURE — 71045 X-RAY EXAM CHEST 1 VIEW: CPT

## 2023-01-01 PROCEDURE — 6360000002 HC RX W HCPCS: Performed by: INTERNAL MEDICINE

## 2023-01-01 PROCEDURE — 2700000000 HC OXYGEN THERAPY PER DAY

## 2023-01-01 PROCEDURE — 36415 COLL VENOUS BLD VENIPUNCTURE: CPT

## 2023-01-01 PROCEDURE — 94003 VENT MGMT INPAT SUBQ DAY: CPT

## 2023-01-01 PROCEDURE — 97530 THERAPEUTIC ACTIVITIES: CPT

## 2023-01-01 PROCEDURE — 80053 COMPREHEN METABOLIC PANEL: CPT

## 2023-01-01 PROCEDURE — 6370000000 HC RX 637 (ALT 250 FOR IP): Performed by: INTERNAL MEDICINE

## 2023-01-01 PROCEDURE — 99233 SBSQ HOSP IP/OBS HIGH 50: CPT | Performed by: INTERNAL MEDICINE

## 2023-01-01 PROCEDURE — 6360000002 HC RX W HCPCS: Performed by: PSYCHIATRY & NEUROLOGY

## 2023-01-01 PROCEDURE — 83605 ASSAY OF LACTIC ACID: CPT

## 2023-01-01 PROCEDURE — C8929 TTE W OR WO FOL WCON,DOPPLER: HCPCS

## 2023-01-01 PROCEDURE — 87205 SMEAR GRAM STAIN: CPT

## 2023-01-01 PROCEDURE — 2500000003 HC RX 250 WO HCPCS: Performed by: INTERNAL MEDICINE

## 2023-01-01 PROCEDURE — 6370000000 HC RX 637 (ALT 250 FOR IP): Performed by: STUDENT IN AN ORGANIZED HEALTH CARE EDUCATION/TRAINING PROGRAM

## 2023-01-01 PROCEDURE — 99231 SBSQ HOSP IP/OBS SF/LOW 25: CPT | Performed by: PSYCHIATRY & NEUROLOGY

## 2023-01-01 PROCEDURE — 84132 ASSAY OF SERUM POTASSIUM: CPT

## 2023-01-01 PROCEDURE — 2500000003 HC RX 250 WO HCPCS: Performed by: ANESTHESIOLOGY

## 2023-01-01 PROCEDURE — 87070 CULTURE OTHR SPECIMN AEROBIC: CPT

## 2023-01-01 PROCEDURE — 2500000003 HC RX 250 WO HCPCS

## 2023-01-01 PROCEDURE — 6360000002 HC RX W HCPCS: Performed by: ANESTHESIOLOGY

## 2023-01-01 PROCEDURE — 36600 WITHDRAWAL OF ARTERIAL BLOOD: CPT

## 2023-01-01 PROCEDURE — 72125 CT NECK SPINE W/O DYE: CPT

## 2023-01-01 PROCEDURE — 99232 SBSQ HOSP IP/OBS MODERATE 35: CPT | Performed by: PSYCHIATRY & NEUROLOGY

## 2023-01-01 PROCEDURE — 94002 VENT MGMT INPAT INIT DAY: CPT

## 2023-01-01 PROCEDURE — 95717 EEG PHYS/QHP 2-12 HR W/O VID: CPT | Performed by: PSYCHIATRY & NEUROLOGY

## 2023-01-01 PROCEDURE — 74018 RADEX ABDOMEN 1 VIEW: CPT

## 2023-01-01 PROCEDURE — 94760 N-INVAS EAR/PLS OXIMETRY 1: CPT

## 2023-01-01 PROCEDURE — 2000000000 HC ICU R&B

## 2023-01-01 PROCEDURE — 6360000002 HC RX W HCPCS: Performed by: STUDENT IN AN ORGANIZED HEALTH CARE EDUCATION/TRAINING PROGRAM

## 2023-01-01 PROCEDURE — 92610 EVALUATE SWALLOWING FUNCTION: CPT

## 2023-01-01 PROCEDURE — 83880 ASSAY OF NATRIURETIC PEPTIDE: CPT

## 2023-01-01 PROCEDURE — 6370000000 HC RX 637 (ALT 250 FOR IP): Performed by: SURGERY

## 2023-01-01 PROCEDURE — 84295 ASSAY OF SERUM SODIUM: CPT

## 2023-01-01 PROCEDURE — A4216 STERILE WATER/SALINE, 10 ML: HCPCS | Performed by: INTERNAL MEDICINE

## 2023-01-01 PROCEDURE — 6370000000 HC RX 637 (ALT 250 FOR IP): Performed by: EMERGENCY MEDICINE

## 2023-01-01 PROCEDURE — 84100 ASSAY OF PHOSPHORUS: CPT

## 2023-01-01 PROCEDURE — 99233 SBSQ HOSP IP/OBS HIGH 50: CPT | Performed by: SPECIALIST

## 2023-01-01 PROCEDURE — 96361 HYDRATE IV INFUSION ADD-ON: CPT

## 2023-01-01 PROCEDURE — 6360000002 HC RX W HCPCS

## 2023-01-01 PROCEDURE — 94640 AIRWAY INHALATION TREATMENT: CPT

## 2023-01-01 PROCEDURE — 95816 EEG AWAKE AND DROWSY: CPT | Performed by: PSYCHIATRY & NEUROLOGY

## 2023-01-01 PROCEDURE — 96374 THER/PROPH/DIAG INJ IV PUSH: CPT

## 2023-01-01 PROCEDURE — 80076 HEPATIC FUNCTION PANEL: CPT

## 2023-01-01 PROCEDURE — 82962 GLUCOSE BLOOD TEST: CPT

## 2023-01-01 PROCEDURE — 6370000000 HC RX 637 (ALT 250 FOR IP): Performed by: PSYCHIATRY & NEUROLOGY

## 2023-01-01 PROCEDURE — 2010000000 HC RM ICU SURGICAL

## 2023-01-01 PROCEDURE — 6360000004 HC RX CONTRAST MEDICATION: Performed by: INTERNAL MEDICINE

## 2023-01-01 PROCEDURE — 80048 BASIC METABOLIC PNL TOTAL CA: CPT

## 2023-01-01 PROCEDURE — 83735 ASSAY OF MAGNESIUM: CPT

## 2023-01-01 PROCEDURE — P9045 ALBUMIN (HUMAN), 5%, 250 ML: HCPCS

## 2023-01-01 PROCEDURE — 82550 ASSAY OF CK (CPK): CPT

## 2023-01-01 PROCEDURE — 85027 COMPLETE CBC AUTOMATED: CPT

## 2023-01-01 PROCEDURE — 82140 ASSAY OF AMMONIA: CPT

## 2023-01-01 PROCEDURE — P9045 ALBUMIN (HUMAN), 5%, 250 ML: HCPCS | Performed by: STUDENT IN AN ORGANIZED HEALTH CARE EDUCATION/TRAINING PROGRAM

## 2023-01-01 PROCEDURE — 99233 SBSQ HOSP IP/OBS HIGH 50: CPT | Performed by: PHYSICAL MEDICINE & REHABILITATION

## 2023-01-01 PROCEDURE — 51702 INSERT TEMP BLADDER CATH: CPT

## 2023-01-01 PROCEDURE — 0BH17EZ INSERTION OF ENDOTRACHEAL AIRWAY INTO TRACHEA, VIA NATURAL OR ARTIFICIAL OPENING: ICD-10-PCS | Performed by: INTERNAL MEDICINE

## 2023-01-01 PROCEDURE — 2580000003 HC RX 258: Performed by: EMERGENCY MEDICINE

## 2023-01-01 PROCEDURE — 51798 US URINE CAPACITY MEASURE: CPT

## 2023-01-01 PROCEDURE — 0BJ08ZZ INSPECTION OF TRACHEOBRONCHIAL TREE, VIA NATURAL OR ARTIFICIAL OPENING ENDOSCOPIC: ICD-10-PCS | Performed by: STUDENT IN AN ORGANIZED HEALTH CARE EDUCATION/TRAINING PROGRAM

## 2023-01-01 PROCEDURE — 99291 CRITICAL CARE FIRST HOUR: CPT | Performed by: INTERNAL MEDICINE

## 2023-01-01 PROCEDURE — 99223 1ST HOSP IP/OBS HIGH 75: CPT | Performed by: PSYCHIATRY & NEUROLOGY

## 2023-01-01 PROCEDURE — 97112 NEUROMUSCULAR REEDUCATION: CPT

## 2023-01-01 PROCEDURE — 5A1945Z RESPIRATORY VENTILATION, 24-96 CONSECUTIVE HOURS: ICD-10-PCS | Performed by: INTERNAL MEDICINE

## 2023-01-01 PROCEDURE — 99232 SBSQ HOSP IP/OBS MODERATE 35: CPT | Performed by: PHYSICAL MEDICINE & REHABILITATION

## 2023-01-01 PROCEDURE — 92526 ORAL FUNCTION THERAPY: CPT

## 2023-01-01 PROCEDURE — 97162 PT EVAL MOD COMPLEX 30 MIN: CPT

## 2023-01-01 PROCEDURE — 95813 EEG EXTND MNTR 61-119 MIN: CPT | Performed by: PSYCHIATRY & NEUROLOGY

## 2023-01-01 PROCEDURE — 6360000002 HC RX W HCPCS: Performed by: NURSE PRACTITIONER

## 2023-01-01 PROCEDURE — 82947 ASSAY GLUCOSE BLOOD QUANT: CPT

## 2023-01-01 PROCEDURE — 84484 ASSAY OF TROPONIN QUANT: CPT

## 2023-01-01 PROCEDURE — 2500000003 HC RX 250 WO HCPCS: Performed by: EMERGENCY MEDICINE

## 2023-01-01 PROCEDURE — 99285 EMERGENCY DEPT VISIT HI MDM: CPT

## 2023-01-01 PROCEDURE — 31500 INSERT EMERGENCY AIRWAY: CPT

## 2023-01-01 PROCEDURE — 82330 ASSAY OF CALCIUM: CPT

## 2023-01-01 PROCEDURE — 31645 BRNCHSC W/THER ASPIR 1ST: CPT

## 2023-01-01 PROCEDURE — 2580000003 HC RX 258: Performed by: STUDENT IN AN ORGANIZED HEALTH CARE EDUCATION/TRAINING PROGRAM

## 2023-01-01 PROCEDURE — 84145 PROCALCITONIN (PCT): CPT

## 2023-01-01 PROCEDURE — 95816 EEG AWAKE AND DROWSY: CPT

## 2023-01-01 PROCEDURE — 2500000003 HC RX 250 WO HCPCS: Performed by: STUDENT IN AN ORGANIZED HEALTH CARE EDUCATION/TRAINING PROGRAM

## 2023-01-01 PROCEDURE — 97166 OT EVAL MOD COMPLEX 45 MIN: CPT

## 2023-01-01 PROCEDURE — 2580000003 HC RX 258: Performed by: SURGERY

## 2023-01-01 PROCEDURE — 70551 MRI BRAIN STEM W/O DYE: CPT

## 2023-01-01 PROCEDURE — 92612 ENDOSCOPY SWALLOW (FEES) VID: CPT

## 2023-01-01 PROCEDURE — 0202U NFCT DS 22 TRGT SARS-COV-2: CPT

## 2023-01-01 PROCEDURE — 70450 CT HEAD/BRAIN W/O DYE: CPT

## 2023-01-01 PROCEDURE — 99222 1ST HOSP IP/OBS MODERATE 55: CPT | Performed by: PHYSICAL MEDICINE & REHABILITATION

## 2023-01-01 PROCEDURE — 96376 TX/PRO/DX INJ SAME DRUG ADON: CPT

## 2023-01-01 RX ORDER — SODIUM CHLORIDE 0.9 % (FLUSH) 0.9 %
5-40 SYRINGE (ML) INJECTION PRN
Status: DISCONTINUED | OUTPATIENT
Start: 2023-01-01 | End: 2023-01-01 | Stop reason: HOSPADM

## 2023-01-01 RX ORDER — SODIUM CHLORIDE, SODIUM LACTATE, POTASSIUM CHLORIDE, AND CALCIUM CHLORIDE .6; .31; .03; .02 G/100ML; G/100ML; G/100ML; G/100ML
500 INJECTION, SOLUTION INTRAVENOUS ONCE
Status: COMPLETED | OUTPATIENT
Start: 2023-01-01 | End: 2023-01-01

## 2023-01-01 RX ORDER — HYDROXYZINE HCL 10 MG/5 ML
25 SOLUTION, ORAL ORAL ONCE
Status: COMPLETED | OUTPATIENT
Start: 2023-01-01 | End: 2023-01-01

## 2023-01-01 RX ORDER — MIDAZOLAM HYDROCHLORIDE 1 MG/ML
INJECTION INTRAMUSCULAR; INTRAVENOUS
Status: COMPLETED
Start: 2023-01-01 | End: 2023-01-01

## 2023-01-01 RX ORDER — ETOMIDATE 2 MG/ML
INJECTION INTRAVENOUS
Status: COMPLETED
Start: 2023-01-01 | End: 2023-01-01

## 2023-01-01 RX ORDER — CASTOR OIL AND BALSAM, PERU 788; 87 MG/G; MG/G
OINTMENT TOPICAL 2 TIMES DAILY
Status: DISCONTINUED | OUTPATIENT
Start: 2023-01-01 | End: 2023-01-01 | Stop reason: HOSPADM

## 2023-01-01 RX ORDER — FENTANYL CITRATE 50 UG/ML
50 INJECTION, SOLUTION INTRAMUSCULAR; INTRAVENOUS ONCE
Status: DISCONTINUED | OUTPATIENT
Start: 2023-01-01 | End: 2023-01-01 | Stop reason: SDUPTHER

## 2023-01-01 RX ORDER — SODIUM CHLORIDE, SODIUM LACTATE, POTASSIUM CHLORIDE, AND CALCIUM CHLORIDE .6; .31; .03; .02 G/100ML; G/100ML; G/100ML; G/100ML
1000 INJECTION, SOLUTION INTRAVENOUS ONCE
Status: COMPLETED | OUTPATIENT
Start: 2023-01-01 | End: 2023-01-01

## 2023-01-01 RX ORDER — CHLORHEXIDINE GLUCONATE 0.12 MG/ML
15 RINSE ORAL 2 TIMES DAILY
Status: DISCONTINUED | OUTPATIENT
Start: 2023-01-01 | End: 2023-01-01 | Stop reason: HOSPADM

## 2023-01-01 RX ORDER — 0.9 % SODIUM CHLORIDE 0.9 %
1000 INTRAVENOUS SOLUTION INTRAVENOUS ONCE
Status: COMPLETED | OUTPATIENT
Start: 2023-01-01 | End: 2023-01-01

## 2023-01-01 RX ORDER — FENTANYL CITRATE 50 UG/ML
100 INJECTION, SOLUTION INTRAMUSCULAR; INTRAVENOUS ONCE
Status: COMPLETED | OUTPATIENT
Start: 2023-01-01 | End: 2023-01-01

## 2023-01-01 RX ORDER — LEVETIRACETAM 500 MG/5ML
250 INJECTION, SOLUTION, CONCENTRATE INTRAVENOUS EVERY 12 HOURS
Status: DISCONTINUED | OUTPATIENT
Start: 2023-01-01 | End: 2023-01-01 | Stop reason: HOSPADM

## 2023-01-01 RX ORDER — ENOXAPARIN SODIUM 100 MG/ML
1 INJECTION SUBCUTANEOUS EVERY 12 HOURS
Status: DISCONTINUED | OUTPATIENT
Start: 2023-01-01 | End: 2023-01-01

## 2023-01-01 RX ORDER — PROPOFOL 10 MG/ML
5-50 INJECTION, EMULSION INTRAVENOUS CONTINUOUS
Status: DISCONTINUED | OUTPATIENT
Start: 2023-01-01 | End: 2023-01-01 | Stop reason: HOSPADM

## 2023-01-01 RX ORDER — ACETAMINOPHEN 325 MG/1
650 TABLET ORAL EVERY 6 HOURS PRN
Status: DISCONTINUED | OUTPATIENT
Start: 2023-01-01 | End: 2023-01-01 | Stop reason: HOSPADM

## 2023-01-01 RX ORDER — LEVETIRACETAM 500 MG/5ML
500 INJECTION, SOLUTION, CONCENTRATE INTRAVENOUS EVERY 12 HOURS
Status: DISCONTINUED | OUTPATIENT
Start: 2023-01-01 | End: 2023-01-01

## 2023-01-01 RX ORDER — AMIODARONE HYDROCHLORIDE 50 MG/ML
150 INJECTION, SOLUTION INTRAVENOUS ONCE
Status: COMPLETED | OUTPATIENT
Start: 2023-01-01 | End: 2023-01-01

## 2023-01-01 RX ORDER — ACETYLCYSTEINE 200 MG/ML
600 SOLUTION ORAL; RESPIRATORY (INHALATION)
Status: DISCONTINUED | OUTPATIENT
Start: 2023-01-01 | End: 2023-01-01

## 2023-01-01 RX ORDER — PROPOFOL 10 MG/ML
5-50 INJECTION, EMULSION INTRAVENOUS CONTINUOUS
Status: DISCONTINUED | OUTPATIENT
Start: 2023-01-01 | End: 2023-01-01

## 2023-01-01 RX ORDER — DOPAMINE HYDROCHLORIDE 160 MG/100ML
1-20 INJECTION, SOLUTION INTRAVENOUS CONTINUOUS
Status: DISCONTINUED | OUTPATIENT
Start: 2023-01-01 | End: 2023-01-01 | Stop reason: SDUPTHER

## 2023-01-01 RX ORDER — DEXMEDETOMIDINE HYDROCHLORIDE 4 UG/ML
.1-1.5 INJECTION, SOLUTION INTRAVENOUS CONTINUOUS
Status: DISCONTINUED | OUTPATIENT
Start: 2023-01-01 | End: 2023-01-01 | Stop reason: HOSPADM

## 2023-01-01 RX ORDER — HALOPERIDOL 5 MG/ML
5 INJECTION INTRAMUSCULAR EVERY 6 HOURS PRN
Status: DISCONTINUED | OUTPATIENT
Start: 2023-01-01 | End: 2023-01-01 | Stop reason: HOSPADM

## 2023-01-01 RX ORDER — DEXTROSE MONOHYDRATE 100 MG/ML
INJECTION, SOLUTION INTRAVENOUS CONTINUOUS PRN
Status: DISCONTINUED | OUTPATIENT
Start: 2023-01-01 | End: 2023-01-01 | Stop reason: HOSPADM

## 2023-01-01 RX ORDER — ONDANSETRON 4 MG/1
4 TABLET, ORALLY DISINTEGRATING ORAL EVERY 8 HOURS PRN
Status: DISCONTINUED | OUTPATIENT
Start: 2023-01-01 | End: 2023-01-01 | Stop reason: HOSPADM

## 2023-01-01 RX ORDER — HYDRALAZINE HYDROCHLORIDE 20 MG/ML
10 INJECTION INTRAMUSCULAR; INTRAVENOUS EVERY 6 HOURS PRN
Status: DISCONTINUED | OUTPATIENT
Start: 2023-01-01 | End: 2023-01-01 | Stop reason: HOSPADM

## 2023-01-01 RX ORDER — FENTANYL CITRATE 50 UG/ML
50 INJECTION, SOLUTION INTRAMUSCULAR; INTRAVENOUS ONCE
Status: COMPLETED | OUTPATIENT
Start: 2023-01-01 | End: 2023-01-01

## 2023-01-01 RX ORDER — HYDROXYZINE HCL 10 MG/5 ML
50 SOLUTION, ORAL ORAL ONCE
Status: COMPLETED | OUTPATIENT
Start: 2023-01-01 | End: 2023-01-01

## 2023-01-01 RX ORDER — PROPOFOL 10 MG/ML
INJECTION, EMULSION INTRAVENOUS
Status: COMPLETED
Start: 2023-01-01 | End: 2023-01-01

## 2023-01-01 RX ORDER — ALBUMIN, HUMAN INJ 5% 5 %
12.5 SOLUTION INTRAVENOUS ONCE
Status: COMPLETED | OUTPATIENT
Start: 2023-01-01 | End: 2023-01-01

## 2023-01-01 RX ORDER — ACETAMINOPHEN 650 MG/1
650 SUPPOSITORY RECTAL EVERY 6 HOURS PRN
Status: DISCONTINUED | OUTPATIENT
Start: 2023-01-01 | End: 2023-01-01 | Stop reason: HOSPADM

## 2023-01-01 RX ORDER — OXYCODONE HCL 5 MG/5 ML
10 SOLUTION, ORAL ORAL EVERY 4 HOURS PRN
Status: DISCONTINUED | OUTPATIENT
Start: 2023-01-01 | End: 2023-01-01

## 2023-01-01 RX ORDER — ROCURONIUM BROMIDE 10 MG/ML
100 INJECTION, SOLUTION INTRAVENOUS ONCE
Status: CANCELLED | OUTPATIENT
Start: 2023-01-01 | End: 2023-01-01

## 2023-01-01 RX ORDER — ENOXAPARIN SODIUM 100 MG/ML
40 INJECTION SUBCUTANEOUS DAILY
Status: DISCONTINUED | OUTPATIENT
Start: 2023-01-01 | End: 2023-01-01 | Stop reason: HOSPADM

## 2023-01-01 RX ORDER — FUROSEMIDE 10 MG/ML
20 INJECTION INTRAMUSCULAR; INTRAVENOUS 3 TIMES DAILY
Status: DISCONTINUED | OUTPATIENT
Start: 2023-01-01 | End: 2023-01-01

## 2023-01-01 RX ORDER — PHENYLEPHRINE HCL IN 0.9% NACL 0.4MG/10ML
SYRINGE (ML) INTRAVENOUS
Status: DISPENSED
Start: 2023-01-01 | End: 2023-01-01

## 2023-01-01 RX ORDER — SODIUM CHLORIDE, SODIUM LACTATE, POTASSIUM CHLORIDE, CALCIUM CHLORIDE 600; 310; 30; 20 MG/100ML; MG/100ML; MG/100ML; MG/100ML
INJECTION, SOLUTION INTRAVENOUS CONTINUOUS
Status: DISCONTINUED | OUTPATIENT
Start: 2023-01-01 | End: 2023-01-01

## 2023-01-01 RX ORDER — RISPERIDONE 1 MG/ML
0.5 SOLUTION ORAL 2 TIMES DAILY
Status: DISCONTINUED | OUTPATIENT
Start: 2023-01-01 | End: 2023-01-01

## 2023-01-01 RX ORDER — MIDAZOLAM HYDROCHLORIDE 2 MG/2ML
2 INJECTION, SOLUTION INTRAMUSCULAR; INTRAVENOUS ONCE
Status: COMPLETED | OUTPATIENT
Start: 2023-01-01 | End: 2023-01-01

## 2023-01-01 RX ORDER — LORAZEPAM 2 MG/ML
4 INJECTION INTRAMUSCULAR ONCE
Status: COMPLETED | OUTPATIENT
Start: 2023-01-01 | End: 2023-01-01

## 2023-01-01 RX ORDER — ENOXAPARIN SODIUM 100 MG/ML
40 INJECTION SUBCUTANEOUS EVERY 12 HOURS
Status: DISCONTINUED | OUTPATIENT
Start: 2023-01-01 | End: 2023-01-01 | Stop reason: DRUGHIGH

## 2023-01-01 RX ORDER — SODIUM CHLORIDE 0.9 % (FLUSH) 0.9 %
5-40 SYRINGE (ML) INJECTION EVERY 12 HOURS SCHEDULED
Status: DISCONTINUED | OUTPATIENT
Start: 2023-01-01 | End: 2023-01-01 | Stop reason: HOSPADM

## 2023-01-01 RX ORDER — NOREPINEPHRINE BITARTRATE 0.06 MG/ML
1-100 INJECTION, SOLUTION INTRAVENOUS CONTINUOUS
Status: DISCONTINUED | OUTPATIENT
Start: 2023-01-01 | End: 2023-01-01 | Stop reason: SDUPTHER

## 2023-01-01 RX ORDER — MORPHINE SULFATE 2 MG/ML
1 INJECTION, SOLUTION INTRAMUSCULAR; INTRAVENOUS
Status: COMPLETED | OUTPATIENT
Start: 2023-01-01 | End: 2023-01-01

## 2023-01-01 RX ORDER — ALBUTEROL SULFATE 2.5 MG/3ML
2.5 SOLUTION RESPIRATORY (INHALATION) EVERY 4 HOURS PRN
Status: DISCONTINUED | OUTPATIENT
Start: 2023-01-01 | End: 2023-01-01 | Stop reason: HOSPADM

## 2023-01-01 RX ORDER — GLYCOPYRROLATE 0.2 MG/ML
0.2 INJECTION INTRAMUSCULAR; INTRAVENOUS EVERY 5 MIN PRN
Status: DISCONTINUED | OUTPATIENT
Start: 2023-01-01 | End: 2023-01-01 | Stop reason: HOSPADM

## 2023-01-01 RX ORDER — ENOXAPARIN SODIUM 100 MG/ML
40 INJECTION SUBCUTANEOUS DAILY
Status: DISCONTINUED | OUTPATIENT
Start: 2023-01-01 | End: 2023-01-01

## 2023-01-01 RX ORDER — SODIUM CHLORIDE 9 MG/ML
INJECTION, SOLUTION INTRAVENOUS PRN
Status: DISCONTINUED | OUTPATIENT
Start: 2023-01-01 | End: 2023-01-01 | Stop reason: HOSPADM

## 2023-01-01 RX ORDER — LACTULOSE 10 G/15ML
20 SOLUTION ORAL 3 TIMES DAILY
Status: DISCONTINUED | OUTPATIENT
Start: 2023-01-01 | End: 2023-01-01

## 2023-01-01 RX ORDER — OXYCODONE HCL 5 MG/5 ML
5 SOLUTION, ORAL ORAL EVERY 6 HOURS
Status: COMPLETED | OUTPATIENT
Start: 2023-01-01 | End: 2023-01-01

## 2023-01-01 RX ORDER — INSULIN LISPRO 100 [IU]/ML
0-8 INJECTION, SOLUTION INTRAVENOUS; SUBCUTANEOUS EVERY 6 HOURS
Status: DISCONTINUED | OUTPATIENT
Start: 2023-01-01 | End: 2023-01-01 | Stop reason: HOSPADM

## 2023-01-01 RX ORDER — RISPERIDONE 1 MG/ML
0.5 SOLUTION ORAL 2 TIMES DAILY PRN
Status: DISCONTINUED | OUTPATIENT
Start: 2023-01-01 | End: 2023-01-01

## 2023-01-01 RX ORDER — ROCURONIUM BROMIDE 10 MG/ML
INJECTION, SOLUTION INTRAVENOUS
Status: COMPLETED
Start: 2023-01-01 | End: 2023-01-01

## 2023-01-01 RX ORDER — ACETAMINOPHEN 500 MG
1000 TABLET ORAL EVERY 8 HOURS SCHEDULED
Status: DISCONTINUED | OUTPATIENT
Start: 2023-01-01 | End: 2023-01-01 | Stop reason: HOSPADM

## 2023-01-01 RX ORDER — AMIODARONE HYDROCHLORIDE 50 MG/ML
INJECTION, SOLUTION INTRAVENOUS
Status: COMPLETED
Start: 2023-01-01 | End: 2023-01-01

## 2023-01-01 RX ORDER — FUROSEMIDE 10 MG/ML
20 INJECTION INTRAMUSCULAR; INTRAVENOUS ONCE
Status: COMPLETED | OUTPATIENT
Start: 2023-01-01 | End: 2023-01-01

## 2023-01-01 RX ORDER — FENTANYL CITRATE 50 UG/ML
INJECTION, SOLUTION INTRAMUSCULAR; INTRAVENOUS
Status: COMPLETED
Start: 2023-01-01 | End: 2023-01-01

## 2023-01-01 RX ORDER — ALBUMIN, HUMAN INJ 5% 5 %
25 SOLUTION INTRAVENOUS ONCE
Status: COMPLETED | OUTPATIENT
Start: 2023-01-01 | End: 2023-01-01

## 2023-01-01 RX ORDER — DEXMEDETOMIDINE HYDROCHLORIDE 4 UG/ML
.1-1.5 INJECTION, SOLUTION INTRAVENOUS CONTINUOUS
Status: DISCONTINUED | OUTPATIENT
Start: 2023-01-01 | End: 2023-01-01 | Stop reason: SDUPTHER

## 2023-01-01 RX ORDER — QUETIAPINE FUMARATE 25 MG/1
25 TABLET, FILM COATED ORAL 2 TIMES DAILY
Status: DISCONTINUED | OUTPATIENT
Start: 2023-01-01 | End: 2023-01-01 | Stop reason: HOSPADM

## 2023-01-01 RX ORDER — POTASSIUM CHLORIDE 7.45 MG/ML
10 INJECTION INTRAVENOUS
Status: COMPLETED | OUTPATIENT
Start: 2023-01-01 | End: 2023-01-01

## 2023-01-01 RX ORDER — FENTANYL CITRATE 50 UG/ML
50 INJECTION, SOLUTION INTRAMUSCULAR; INTRAVENOUS ONCE
Status: DISCONTINUED | OUTPATIENT
Start: 2023-01-01 | End: 2023-01-01

## 2023-01-01 RX ORDER — AMIODARONE HYDROCHLORIDE 200 MG/1
400 TABLET ORAL 2 TIMES DAILY
Status: DISCONTINUED | OUTPATIENT
Start: 2023-01-01 | End: 2023-01-01 | Stop reason: HOSPADM

## 2023-01-01 RX ORDER — ETOMIDATE 2 MG/ML
20 INJECTION INTRAVENOUS ONCE
Status: COMPLETED | OUTPATIENT
Start: 2023-01-01 | End: 2023-01-01

## 2023-01-01 RX ORDER — POLYETHYLENE GLYCOL 3350 17 G/17G
17 POWDER, FOR SOLUTION ORAL DAILY PRN
Status: DISCONTINUED | OUTPATIENT
Start: 2023-01-01 | End: 2023-01-01 | Stop reason: HOSPADM

## 2023-01-01 RX ORDER — ALBUMIN, HUMAN INJ 5% 5 %
SOLUTION INTRAVENOUS
Status: COMPLETED
Start: 2023-01-01 | End: 2023-01-01

## 2023-01-01 RX ORDER — ONDANSETRON 2 MG/ML
4 INJECTION INTRAMUSCULAR; INTRAVENOUS EVERY 6 HOURS PRN
Status: DISCONTINUED | OUTPATIENT
Start: 2023-01-01 | End: 2023-01-01 | Stop reason: HOSPADM

## 2023-01-01 RX ORDER — LEVETIRACETAM 500 MG/5ML
1000 INJECTION, SOLUTION, CONCENTRATE INTRAVENOUS EVERY 12 HOURS
Status: DISCONTINUED | OUTPATIENT
Start: 2023-01-01 | End: 2023-01-01

## 2023-01-01 RX ORDER — FENTANYL CITRATE 50 UG/ML
50 INJECTION, SOLUTION INTRAMUSCULAR; INTRAVENOUS
Status: DISCONTINUED | OUTPATIENT
Start: 2023-01-01 | End: 2023-01-01

## 2023-01-01 RX ORDER — LANOLIN ALCOHOL/MO/W.PET/CERES
6 CREAM (GRAM) TOPICAL NIGHTLY PRN
Status: DISCONTINUED | OUTPATIENT
Start: 2023-01-01 | End: 2023-01-01 | Stop reason: HOSPADM

## 2023-01-01 RX ADMIN — POTASSIUM BICARBONATE 40 MEQ: 782 TABLET, EFFERVESCENT ORAL at 16:52

## 2023-01-01 RX ADMIN — DEXMEDETOMIDINE HYDROCHLORIDE 0.8 MCG/KG/HR: 400 INJECTION, SOLUTION INTRAVENOUS at 03:17

## 2023-01-01 RX ADMIN — DEXMEDETOMIDINE HYDROCHLORIDE 0.6 MCG/KG/HR: 400 INJECTION, SOLUTION INTRAVENOUS at 14:42

## 2023-01-01 RX ADMIN — SODIUM CHLORIDE, PRESERVATIVE FREE 10 ML: 5 INJECTION INTRAVENOUS at 08:52

## 2023-01-01 RX ADMIN — SODIUM CHLORIDE, POTASSIUM CHLORIDE, SODIUM LACTATE AND CALCIUM CHLORIDE 1000 ML: 600; 310; 30; 20 INJECTION, SOLUTION INTRAVENOUS at 17:30

## 2023-01-01 RX ADMIN — ACETAMINOPHEN 1000 MG: 500 TABLET ORAL at 14:43

## 2023-01-01 RX ADMIN — LEVETIRACETAM 1000 MG: 100 INJECTION, SOLUTION, CONCENTRATE INTRAVENOUS at 23:14

## 2023-01-01 RX ADMIN — ACETAMINOPHEN 1000 MG: 500 TABLET ORAL at 06:07

## 2023-01-01 RX ADMIN — ACETAMINOPHEN 1000 MG: 500 TABLET ORAL at 21:48

## 2023-01-01 RX ADMIN — Medication 6 MG: at 20:09

## 2023-01-01 RX ADMIN — ACETAMINOPHEN 650 MG: 325 TABLET ORAL at 15:14

## 2023-01-01 RX ADMIN — DEXMEDETOMIDINE HYDROCHLORIDE 1 MCG/KG/HR: 400 INJECTION, SOLUTION INTRAVENOUS at 11:08

## 2023-01-01 RX ADMIN — QUETIAPINE FUMARATE 25 MG: 25 TABLET ORAL at 21:13

## 2023-01-01 RX ADMIN — LACTULOSE 20 G: 10 SOLUTION ORAL at 09:30

## 2023-01-01 RX ADMIN — FUROSEMIDE 20 MG: 10 INJECTION, SOLUTION INTRAMUSCULAR; INTRAVENOUS at 08:35

## 2023-01-01 RX ADMIN — ENOXAPARIN SODIUM 40 MG: 100 INJECTION SUBCUTANEOUS at 09:15

## 2023-01-01 RX ADMIN — SODIUM CHLORIDE, POTASSIUM CHLORIDE, SODIUM LACTATE AND CALCIUM CHLORIDE 75 ML/HR: 600; 310; 30; 20 INJECTION, SOLUTION INTRAVENOUS at 05:33

## 2023-01-01 RX ADMIN — Medication: at 08:39

## 2023-01-01 RX ADMIN — AMIODARONE HYDROCHLORIDE 400 MG: 200 TABLET ORAL at 08:15

## 2023-01-01 RX ADMIN — WATER 1000 MG: 1 INJECTION INTRAMUSCULAR; INTRAVENOUS; SUBCUTANEOUS at 17:07

## 2023-01-01 RX ADMIN — CHLORHEXIDINE GLUCONATE 15 ML: 1.2 RINSE ORAL at 20:42

## 2023-01-01 RX ADMIN — ACETAMINOPHEN 1000 MG: 500 TABLET ORAL at 14:01

## 2023-01-01 RX ADMIN — ALBUTEROL SULFATE 2.5 MG: 2.5 SOLUTION RESPIRATORY (INHALATION) at 08:02

## 2023-01-01 RX ADMIN — SODIUM CHLORIDE, PRESERVATIVE FREE 10 ML: 5 INJECTION INTRAVENOUS at 09:00

## 2023-01-01 RX ADMIN — FUROSEMIDE 20 MG: 10 INJECTION, SOLUTION INTRAMUSCULAR; INTRAVENOUS at 10:26

## 2023-01-01 RX ADMIN — ENOXAPARIN SODIUM 40 MG: 100 INJECTION SUBCUTANEOUS at 08:31

## 2023-01-01 RX ADMIN — ACETAMINOPHEN 1000 MG: 500 TABLET ORAL at 22:05

## 2023-01-01 RX ADMIN — FUROSEMIDE 20 MG: 10 INJECTION, SOLUTION INTRAMUSCULAR; INTRAVENOUS at 14:01

## 2023-01-01 RX ADMIN — CHLORHEXIDINE GLUCONATE 15 ML: 1.2 RINSE ORAL at 20:11

## 2023-01-01 RX ADMIN — DEXMEDETOMIDINE HYDROCHLORIDE 0.2 MCG/KG/HR: 400 INJECTION, SOLUTION INTRAVENOUS at 07:45

## 2023-01-01 RX ADMIN — QUETIAPINE FUMARATE 25 MG: 25 TABLET ORAL at 14:02

## 2023-01-01 RX ADMIN — FENTANYL CITRATE 100 MCG: 50 INJECTION INTRAMUSCULAR; INTRAVENOUS at 11:15

## 2023-01-01 RX ADMIN — CHLORHEXIDINE GLUCONATE 15 ML: 1.2 RINSE ORAL at 09:08

## 2023-01-01 RX ADMIN — DEXMEDETOMIDINE HYDROCHLORIDE 0.6 MCG/KG/HR: 400 INJECTION, SOLUTION INTRAVENOUS at 23:18

## 2023-01-01 RX ADMIN — QUETIAPINE FUMARATE 25 MG: 25 TABLET ORAL at 09:07

## 2023-01-01 RX ADMIN — SODIUM CHLORIDE, PRESERVATIVE FREE 10 ML: 5 INJECTION INTRAVENOUS at 22:06

## 2023-01-01 RX ADMIN — Medication 6 MG: at 22:05

## 2023-01-01 RX ADMIN — ACETAMINOPHEN 650 MG: 325 TABLET ORAL at 23:05

## 2023-01-01 RX ADMIN — CHLORHEXIDINE GLUCONATE 15 ML: 1.2 RINSE ORAL at 14:02

## 2023-01-01 RX ADMIN — AMIODARONE HYDROCHLORIDE 400 MG: 200 TABLET ORAL at 09:34

## 2023-01-01 RX ADMIN — DEXMEDETOMIDINE HYDROCHLORIDE 1.2 MCG/KG/HR: 400 INJECTION, SOLUTION INTRAVENOUS at 12:12

## 2023-01-01 RX ADMIN — ACETAMINOPHEN 1000 MG: 500 TABLET ORAL at 20:10

## 2023-01-01 RX ADMIN — AMIODARONE HYDROCHLORIDE 400 MG: 200 TABLET ORAL at 21:13

## 2023-01-01 RX ADMIN — DEXMEDETOMIDINE HYDROCHLORIDE 1 MCG/KG/HR: 400 INJECTION, SOLUTION INTRAVENOUS at 04:45

## 2023-01-01 RX ADMIN — POTASSIUM CHLORIDE 10 MEQ: 7.46 INJECTION, SOLUTION INTRAVENOUS at 09:00

## 2023-01-01 RX ADMIN — CHLORHEXIDINE GLUCONATE 15 ML: 1.2 RINSE ORAL at 09:15

## 2023-01-01 RX ADMIN — SODIUM CHLORIDE, PRESERVATIVE FREE 10 ML: 5 INJECTION INTRAVENOUS at 21:08

## 2023-01-01 RX ADMIN — MIDAZOLAM 2 MG: 1 INJECTION INTRAMUSCULAR; INTRAVENOUS at 11:15

## 2023-01-01 RX ADMIN — DEXMEDETOMIDINE HYDROCHLORIDE 1 MCG/KG/HR: 400 INJECTION, SOLUTION INTRAVENOUS at 06:10

## 2023-01-01 RX ADMIN — DEXMEDETOMIDINE HYDROCHLORIDE 0.2 MCG/KG/HR: 400 INJECTION, SOLUTION INTRAVENOUS at 00:50

## 2023-01-01 RX ADMIN — SODIUM CHLORIDE, PRESERVATIVE FREE 20 MG: 5 INJECTION INTRAVENOUS at 21:36

## 2023-01-01 RX ADMIN — ACETAMINOPHEN 1000 MG: 500 TABLET ORAL at 14:25

## 2023-01-01 RX ADMIN — WATER 1000 MG: 1 INJECTION INTRAMUSCULAR; INTRAVENOUS; SUBCUTANEOUS at 17:16

## 2023-01-01 RX ADMIN — DEXMEDETOMIDINE HYDROCHLORIDE 0.8 MCG/KG/HR: 400 INJECTION, SOLUTION INTRAVENOUS at 17:05

## 2023-01-01 RX ADMIN — PROPOFOL 15 MCG/KG/MIN: 10 INJECTION, EMULSION INTRAVENOUS at 12:58

## 2023-01-01 RX ADMIN — SODIUM CHLORIDE, PRESERVATIVE FREE 10 ML: 5 INJECTION INTRAVENOUS at 09:07

## 2023-01-01 RX ADMIN — FUROSEMIDE 20 MG: 10 INJECTION, SOLUTION INTRAMUSCULAR; INTRAVENOUS at 14:04

## 2023-01-01 RX ADMIN — LEVETIRACETAM 500 MG: 100 INJECTION, SOLUTION INTRAVENOUS at 01:02

## 2023-01-01 RX ADMIN — LEVETIRACETAM 1000 MG: 100 INJECTION, SOLUTION, CONCENTRATE INTRAVENOUS at 12:20

## 2023-01-01 RX ADMIN — PROPOFOL 15 MCG/KG/MIN: 10 INJECTION, EMULSION INTRAVENOUS at 14:49

## 2023-01-01 RX ADMIN — FUROSEMIDE 20 MG: 10 INJECTION, SOLUTION INTRAMUSCULAR; INTRAVENOUS at 09:15

## 2023-01-01 RX ADMIN — Medication: at 08:31

## 2023-01-01 RX ADMIN — LEVETIRACETAM 500 MG: 100 INJECTION, SOLUTION INTRAVENOUS at 12:08

## 2023-01-01 RX ADMIN — Medication: at 09:08

## 2023-01-01 RX ADMIN — Medication: at 08:12

## 2023-01-01 RX ADMIN — AMIODARONE HYDROCHLORIDE 400 MG: 200 TABLET ORAL at 22:04

## 2023-01-01 RX ADMIN — Medication: at 09:16

## 2023-01-01 RX ADMIN — LEVETIRACETAM 250 MG: 100 INJECTION, SOLUTION INTRAVENOUS at 12:35

## 2023-01-01 RX ADMIN — LACTULOSE 20 G: 10 SOLUTION ORAL at 22:04

## 2023-01-01 RX ADMIN — ACETYLCYSTEINE 600 MG: 200 SOLUTION ORAL; RESPIRATORY (INHALATION) at 08:02

## 2023-01-01 RX ADMIN — FUROSEMIDE 20 MG: 10 INJECTION, SOLUTION INTRAMUSCULAR; INTRAVENOUS at 14:08

## 2023-01-01 RX ADMIN — Medication: at 20:41

## 2023-01-01 RX ADMIN — AMIODARONE HYDROCHLORIDE 400 MG: 200 TABLET ORAL at 08:31

## 2023-01-01 RX ADMIN — DEXMEDETOMIDINE HYDROCHLORIDE 0.8 MCG/KG/HR: 400 INJECTION, SOLUTION INTRAVENOUS at 21:16

## 2023-01-01 RX ADMIN — AMIODARONE HYDROCHLORIDE 400 MG: 200 TABLET ORAL at 20:42

## 2023-01-01 RX ADMIN — SODIUM CHLORIDE, PRESERVATIVE FREE 10 ML: 5 INJECTION INTRAVENOUS at 20:43

## 2023-01-01 RX ADMIN — DEXMEDETOMIDINE HYDROCHLORIDE 0.6 MCG/KG/HR: 400 INJECTION, SOLUTION INTRAVENOUS at 09:48

## 2023-01-01 RX ADMIN — FENTANYL CITRATE 100 MCG: 50 INJECTION, SOLUTION INTRAMUSCULAR; INTRAVENOUS at 11:15

## 2023-01-01 RX ADMIN — AZITHROMYCIN MONOHYDRATE 500 MG: 500 INJECTION, POWDER, LYOPHILIZED, FOR SOLUTION INTRAVENOUS at 17:11

## 2023-01-01 RX ADMIN — HYDRALAZINE HYDROCHLORIDE 10 MG: 20 INJECTION INTRAMUSCULAR; INTRAVENOUS at 18:41

## 2023-01-01 RX ADMIN — ENOXAPARIN SODIUM 90 MG: 100 INJECTION SUBCUTANEOUS at 08:12

## 2023-01-01 RX ADMIN — AZITHROMYCIN MONOHYDRATE 500 MG: 500 INJECTION, POWDER, LYOPHILIZED, FOR SOLUTION INTRAVENOUS at 17:16

## 2023-01-01 RX ADMIN — LEVETIRACETAM 1000 MG: 100 INJECTION, SOLUTION, CONCENTRATE INTRAVENOUS at 00:00

## 2023-01-01 RX ADMIN — LACTULOSE 20 G: 10 SOLUTION ORAL at 14:41

## 2023-01-01 RX ADMIN — AMIODARONE HYDROCHLORIDE 400 MG: 200 TABLET ORAL at 09:07

## 2023-01-01 RX ADMIN — CHLORHEXIDINE GLUCONATE 15 ML: 1.2 RINSE ORAL at 08:32

## 2023-01-01 RX ADMIN — AMIODARONE HYDROCHLORIDE 400 MG: 200 TABLET ORAL at 21:48

## 2023-01-01 RX ADMIN — HALOPERIDOL LACTATE 5 MG: 5 INJECTION, SOLUTION INTRAMUSCULAR at 21:23

## 2023-01-01 RX ADMIN — AMIODARONE HYDROCHLORIDE 400 MG: 200 TABLET ORAL at 21:02

## 2023-01-01 RX ADMIN — ACETAMINOPHEN 1000 MG: 500 TABLET ORAL at 14:10

## 2023-01-01 RX ADMIN — CHLORHEXIDINE GLUCONATE 15 ML: 1.2 RINSE ORAL at 08:31

## 2023-01-01 RX ADMIN — FENTANYL CITRATE 50 MCG: 50 INJECTION INTRAMUSCULAR; INTRAVENOUS at 22:31

## 2023-01-01 RX ADMIN — FUROSEMIDE 20 MG: 10 INJECTION, SOLUTION INTRAMUSCULAR; INTRAVENOUS at 09:35

## 2023-01-01 RX ADMIN — ALBUMIN, HUMAN INJ 5%: 5 SOLUTION at 14:54

## 2023-01-01 RX ADMIN — QUETIAPINE FUMARATE 25 MG: 25 TABLET ORAL at 21:48

## 2023-01-01 RX ADMIN — FUROSEMIDE 20 MG: 10 INJECTION, SOLUTION INTRAMUSCULAR; INTRAVENOUS at 20:41

## 2023-01-01 RX ADMIN — WATER 1000 MG: 1 INJECTION INTRAMUSCULAR; INTRAVENOUS; SUBCUTANEOUS at 16:33

## 2023-01-01 RX ADMIN — FUROSEMIDE 20 MG: 10 INJECTION, SOLUTION INTRAMUSCULAR; INTRAVENOUS at 22:06

## 2023-01-01 RX ADMIN — SODIUM CHLORIDE, POTASSIUM CHLORIDE, SODIUM LACTATE AND CALCIUM CHLORIDE 1000 ML: 600; 310; 30; 20 INJECTION, SOLUTION INTRAVENOUS at 14:14

## 2023-01-01 RX ADMIN — CHLORHEXIDINE GLUCONATE 15 ML: 1.2 RINSE ORAL at 23:02

## 2023-01-01 RX ADMIN — FUROSEMIDE 20 MG: 10 INJECTION, SOLUTION INTRAMUSCULAR; INTRAVENOUS at 12:34

## 2023-01-01 RX ADMIN — SODIUM CHLORIDE, PRESERVATIVE FREE 20 MG: 5 INJECTION INTRAVENOUS at 20:47

## 2023-01-01 RX ADMIN — ETOMIDATE 20 MG: 2 INJECTION INTRAVENOUS at 11:15

## 2023-01-01 RX ADMIN — FUROSEMIDE 20 MG: 10 INJECTION, SOLUTION INTRAMUSCULAR; INTRAVENOUS at 08:31

## 2023-01-01 RX ADMIN — AZITHROMYCIN MONOHYDRATE 500 MG: 500 INJECTION, POWDER, LYOPHILIZED, FOR SOLUTION INTRAVENOUS at 16:42

## 2023-01-01 RX ADMIN — ENOXAPARIN SODIUM 40 MG: 100 INJECTION SUBCUTANEOUS at 09:07

## 2023-01-01 RX ADMIN — SODIUM CHLORIDE, PRESERVATIVE FREE 20 MG: 5 INJECTION INTRAVENOUS at 22:04

## 2023-01-01 RX ADMIN — SODIUM CHLORIDE, PRESERVATIVE FREE 20 MG: 5 INJECTION INTRAVENOUS at 20:42

## 2023-01-01 RX ADMIN — CHLORHEXIDINE GLUCONATE 15 ML: 1.2 RINSE ORAL at 08:12

## 2023-01-01 RX ADMIN — DEXMEDETOMIDINE HYDROCHLORIDE 0.2 MCG/KG/HR: 400 INJECTION, SOLUTION INTRAVENOUS at 14:56

## 2023-01-01 RX ADMIN — AMIODARONE HYDROCHLORIDE 400 MG: 200 TABLET ORAL at 09:02

## 2023-01-01 RX ADMIN — SODIUM PHOSPHATE, MONOBASIC, MONOHYDRATE AND SODIUM PHOSPHATE, DIBASIC, ANHYDROUS 10 MMOL: 276; 142 INJECTION, SOLUTION INTRAVENOUS at 17:59

## 2023-01-01 RX ADMIN — DEXMEDETOMIDINE HYDROCHLORIDE 1 MCG/KG/HR: 400 INJECTION, SOLUTION INTRAVENOUS at 01:30

## 2023-01-01 RX ADMIN — ENOXAPARIN SODIUM 40 MG: 100 INJECTION SUBCUTANEOUS at 08:52

## 2023-01-01 RX ADMIN — ENOXAPARIN SODIUM 90 MG: 100 INJECTION SUBCUTANEOUS at 21:00

## 2023-01-01 RX ADMIN — AMIODARONE HYDROCHLORIDE 0.5 MG/MIN: 50 INJECTION, SOLUTION INTRAVENOUS at 15:09

## 2023-01-01 RX ADMIN — Medication 50 MG: at 18:03

## 2023-01-01 RX ADMIN — PROPOFOL 25 MCG/KG/MIN: 10 INJECTION, EMULSION INTRAVENOUS at 01:25

## 2023-01-01 RX ADMIN — AMIODARONE HYDROCHLORIDE 1 MG/MIN: 50 INJECTION, SOLUTION INTRAVENOUS at 18:12

## 2023-01-01 RX ADMIN — ENOXAPARIN SODIUM 40 MG: 100 INJECTION SUBCUTANEOUS at 21:13

## 2023-01-01 RX ADMIN — LACTULOSE 20 G: 10 SOLUTION ORAL at 14:08

## 2023-01-01 RX ADMIN — ALBUMIN (HUMAN) 12.5 G: 12.5 INJECTION, SOLUTION INTRAVENOUS at 23:13

## 2023-01-01 RX ADMIN — MORPHINE SULFATE 1 MG: 2 INJECTION, SOLUTION INTRAMUSCULAR; INTRAVENOUS at 12:03

## 2023-01-01 RX ADMIN — ACETAMINOPHEN 1000 MG: 500 TABLET ORAL at 12:35

## 2023-01-01 RX ADMIN — FENTANYL CITRATE 50 MCG: 50 INJECTION INTRAMUSCULAR; INTRAVENOUS at 12:09

## 2023-01-01 RX ADMIN — Medication: at 21:15

## 2023-01-01 RX ADMIN — SODIUM CHLORIDE, PRESERVATIVE FREE 10 ML: 5 INJECTION INTRAVENOUS at 20:48

## 2023-01-01 RX ADMIN — ACETAMINOPHEN 1000 MG: 500 TABLET ORAL at 14:41

## 2023-01-01 RX ADMIN — AMIODARONE HYDROCHLORIDE 400 MG: 200 TABLET ORAL at 09:15

## 2023-01-01 RX ADMIN — CHLORHEXIDINE GLUCONATE 15 ML: 1.2 RINSE ORAL at 20:43

## 2023-01-01 RX ADMIN — DEXMEDETOMIDINE HYDROCHLORIDE 0.6 MCG/KG/HR: 400 INJECTION, SOLUTION INTRAVENOUS at 14:10

## 2023-01-01 RX ADMIN — OXYCODONE HYDROCHLORIDE 5 MG: 5 SOLUTION ORAL at 08:52

## 2023-01-01 RX ADMIN — CHLORHEXIDINE GLUCONATE 15 ML: 1.2 RINSE ORAL at 21:36

## 2023-01-01 RX ADMIN — PROPOFOL 1000 MG: 10 INJECTION, EMULSION INTRAVENOUS at 16:09

## 2023-01-01 RX ADMIN — FUROSEMIDE 20 MG: 10 INJECTION, SOLUTION INTRAMUSCULAR; INTRAVENOUS at 22:44

## 2023-01-01 RX ADMIN — SODIUM CHLORIDE, PRESERVATIVE FREE 20 MG: 5 INJECTION INTRAVENOUS at 21:49

## 2023-01-01 RX ADMIN — OXYCODONE HYDROCHLORIDE 5 MG: 5 SOLUTION ORAL at 03:24

## 2023-01-01 RX ADMIN — CHLORHEXIDINE GLUCONATE 15 ML: 1.2 RINSE ORAL at 09:00

## 2023-01-01 RX ADMIN — PHENYLEPHRINE HYDROCHLORIDE 30 MCG/MIN: 10 INJECTION INTRAVENOUS at 06:25

## 2023-01-01 RX ADMIN — OXYCODONE HYDROCHLORIDE 5 MG: 5 SOLUTION ORAL at 16:29

## 2023-01-01 RX ADMIN — Medication: at 21:51

## 2023-01-01 RX ADMIN — FUROSEMIDE 20 MG: 10 INJECTION, SOLUTION INTRAMUSCULAR; INTRAVENOUS at 15:39

## 2023-01-01 RX ADMIN — LEVETIRACETAM 500 MG: 100 INJECTION, SOLUTION INTRAVENOUS at 00:16

## 2023-01-01 RX ADMIN — DEXMEDETOMIDINE HYDROCHLORIDE 1 MCG/KG/HR: 400 INJECTION, SOLUTION INTRAVENOUS at 22:04

## 2023-01-01 RX ADMIN — SODIUM PHOSPHATE, MONOBASIC, MONOHYDRATE AND SODIUM PHOSPHATE, DIBASIC, ANHYDROUS 30 MMOL: 142; 276 INJECTION, SOLUTION INTRAVENOUS at 09:52

## 2023-01-01 RX ADMIN — LEVETIRACETAM 500 MG: 100 INJECTION, SOLUTION INTRAVENOUS at 11:43

## 2023-01-01 RX ADMIN — QUETIAPINE FUMARATE 25 MG: 25 TABLET ORAL at 21:02

## 2023-01-01 RX ADMIN — AMIODARONE HYDROCHLORIDE 1 MG/MIN: 50 INJECTION, SOLUTION INTRAVENOUS at 00:38

## 2023-01-01 RX ADMIN — AMIODARONE HYDROCHLORIDE 400 MG: 200 TABLET ORAL at 20:09

## 2023-01-01 RX ADMIN — HALOPERIDOL LACTATE 5 MG: 5 INJECTION, SOLUTION INTRAMUSCULAR at 05:22

## 2023-01-01 RX ADMIN — SODIUM CHLORIDE, PRESERVATIVE FREE 20 MG: 5 INJECTION INTRAVENOUS at 19:56

## 2023-01-01 RX ADMIN — DEXMEDETOMIDINE HYDROCHLORIDE 0.6 MCG/KG/HR: 400 INJECTION, SOLUTION INTRAVENOUS at 06:50

## 2023-01-01 RX ADMIN — AMIODARONE HYDROCHLORIDE 400 MG: 200 TABLET ORAL at 14:02

## 2023-01-01 RX ADMIN — Medication: at 21:37

## 2023-01-01 RX ADMIN — ENOXAPARIN SODIUM 90 MG: 100 INJECTION SUBCUTANEOUS at 20:42

## 2023-01-01 RX ADMIN — ACETAMINOPHEN 1000 MG: 500 TABLET ORAL at 20:42

## 2023-01-01 RX ADMIN — MIDAZOLAM 2 MG: 1 INJECTION INTRAMUSCULAR; INTRAVENOUS at 17:58

## 2023-01-01 RX ADMIN — CHLORHEXIDINE GLUCONATE 15 ML: 1.2 RINSE ORAL at 08:52

## 2023-01-01 RX ADMIN — ACETYLCYSTEINE 600 MG: 200 SOLUTION ORAL; RESPIRATORY (INHALATION) at 20:17

## 2023-01-01 RX ADMIN — CHLORHEXIDINE GLUCONATE 15 ML: 1.2 RINSE ORAL at 21:13

## 2023-01-01 RX ADMIN — SODIUM CHLORIDE, PRESERVATIVE FREE 10 ML: 5 INJECTION INTRAVENOUS at 08:32

## 2023-01-01 RX ADMIN — DEXMEDETOMIDINE HYDROCHLORIDE 1.5 MCG/KG/HR: 400 INJECTION, SOLUTION INTRAVENOUS at 17:52

## 2023-01-01 RX ADMIN — ROCURONIUM BROMIDE 100 MG: 50 INJECTION, SOLUTION INTRAVENOUS at 11:15

## 2023-01-01 RX ADMIN — PROPOFOL 15 MCG/KG/MIN: 10 INJECTION, EMULSION INTRAVENOUS at 18:21

## 2023-01-01 RX ADMIN — HALOPERIDOL LACTATE 5 MG: 5 INJECTION, SOLUTION INTRAMUSCULAR at 13:38

## 2023-01-01 RX ADMIN — Medication: at 20:11

## 2023-01-01 RX ADMIN — ACETAMINOPHEN 1000 MG: 500 TABLET ORAL at 06:48

## 2023-01-01 RX ADMIN — SODIUM CHLORIDE, PRESERVATIVE FREE 20 MG: 5 INJECTION INTRAVENOUS at 20:09

## 2023-01-01 RX ADMIN — SODIUM CHLORIDE, PRESERVATIVE FREE 10 ML: 5 INJECTION INTRAVENOUS at 21:49

## 2023-01-01 RX ADMIN — SODIUM CHLORIDE, PRESERVATIVE FREE 10 ML: 5 INJECTION INTRAVENOUS at 21:15

## 2023-01-01 RX ADMIN — CHLORHEXIDINE GLUCONATE 15 ML: 1.2 RINSE ORAL at 22:10

## 2023-01-01 RX ADMIN — ACETAMINOPHEN 1000 MG: 500 TABLET ORAL at 06:19

## 2023-01-01 RX ADMIN — DEXMEDETOMIDINE HYDROCHLORIDE 1 MCG/KG/HR: 400 INJECTION, SOLUTION INTRAVENOUS at 01:02

## 2023-01-01 RX ADMIN — LACTULOSE 20 G: 10 SOLUTION ORAL at 08:31

## 2023-01-01 RX ADMIN — ACETAMINOPHEN 1000 MG: 500 TABLET ORAL at 06:10

## 2023-01-01 RX ADMIN — LEVETIRACETAM 500 MG: 100 INJECTION, SOLUTION INTRAVENOUS at 00:32

## 2023-01-01 RX ADMIN — QUETIAPINE FUMARATE 25 MG: 25 TABLET ORAL at 08:31

## 2023-01-01 RX ADMIN — LEVETIRACETAM 250 MG: 100 INJECTION, SOLUTION INTRAVENOUS at 00:18

## 2023-01-01 RX ADMIN — AMIODARONE HYDROCHLORIDE 400 MG: 200 TABLET ORAL at 21:36

## 2023-01-01 RX ADMIN — POTASSIUM CHLORIDE 10 MEQ: 7.46 INJECTION, SOLUTION INTRAVENOUS at 07:08

## 2023-01-01 RX ADMIN — SODIUM CHLORIDE, PRESERVATIVE FREE 10 ML: 5 INJECTION INTRAVENOUS at 20:11

## 2023-01-01 RX ADMIN — ACETAMINOPHEN 1000 MG: 500 TABLET ORAL at 06:04

## 2023-01-01 RX ADMIN — Medication: at 08:52

## 2023-01-01 RX ADMIN — LEVETIRACETAM 1000 MG: 100 INJECTION, SOLUTION, CONCENTRATE INTRAVENOUS at 12:00

## 2023-01-01 RX ADMIN — PROPOFOL 15 MCG/KG/MIN: 10 INJECTION, EMULSION INTRAVENOUS at 05:32

## 2023-01-01 RX ADMIN — AZITHROMYCIN MONOHYDRATE 500 MG: 500 INJECTION, POWDER, LYOPHILIZED, FOR SOLUTION INTRAVENOUS at 16:53

## 2023-01-01 RX ADMIN — Medication 0.5 MG: at 13:21

## 2023-01-01 RX ADMIN — PROPOFOL 25 MCG/KG/MIN: 10 INJECTION, EMULSION INTRAVENOUS at 08:20

## 2023-01-01 RX ADMIN — SODIUM CHLORIDE, PRESERVATIVE FREE 20 MG: 5 INJECTION INTRAVENOUS at 21:03

## 2023-01-01 RX ADMIN — ENOXAPARIN SODIUM 40 MG: 100 INJECTION SUBCUTANEOUS at 19:56

## 2023-01-01 RX ADMIN — DEXMEDETOMIDINE HYDROCHLORIDE 1 MCG/KG/HR: 400 INJECTION, SOLUTION INTRAVENOUS at 20:59

## 2023-01-01 RX ADMIN — Medication: at 09:00

## 2023-01-01 RX ADMIN — ACETAMINOPHEN 1000 MG: 500 TABLET ORAL at 21:36

## 2023-01-01 RX ADMIN — DEXMEDETOMIDINE HYDROCHLORIDE 1 MCG/KG/HR: 400 INJECTION, SOLUTION INTRAVENOUS at 16:01

## 2023-01-01 RX ADMIN — SODIUM CHLORIDE, PRESERVATIVE FREE 10 ML: 5 INJECTION INTRAVENOUS at 09:16

## 2023-01-01 RX ADMIN — SODIUM CHLORIDE, POTASSIUM CHLORIDE, SODIUM LACTATE AND CALCIUM CHLORIDE: 600; 310; 30; 20 INJECTION, SOLUTION INTRAVENOUS at 04:35

## 2023-01-01 RX ADMIN — ALBUTEROL SULFATE 2.5 MG: 2.5 SOLUTION RESPIRATORY (INHALATION) at 20:17

## 2023-01-01 RX ADMIN — PROPOFOL 30 MCG/KG/MIN: 10 INJECTION, EMULSION INTRAVENOUS at 19:41

## 2023-01-01 RX ADMIN — LORAZEPAM 4 MG: 2 INJECTION INTRAMUSCULAR; INTRAVENOUS at 22:59

## 2023-01-01 RX ADMIN — QUETIAPINE FUMARATE 25 MG: 25 TABLET ORAL at 08:52

## 2023-01-01 RX ADMIN — LACTULOSE 20 G: 10 SOLUTION ORAL at 10:00

## 2023-01-01 RX ADMIN — PHENYLEPHRINE HYDROCHLORIDE 20 MCG/MIN: 10 INJECTION INTRAVENOUS at 18:05

## 2023-01-01 RX ADMIN — QUETIAPINE FUMARATE 25 MG: 25 TABLET ORAL at 22:05

## 2023-01-01 RX ADMIN — FENTANYL CITRATE 50 MCG: 50 INJECTION INTRAMUSCULAR; INTRAVENOUS at 22:59

## 2023-01-01 RX ADMIN — DEXMEDETOMIDINE HYDROCHLORIDE 0.3 MCG/KG/HR: 400 INJECTION, SOLUTION INTRAVENOUS at 20:09

## 2023-01-01 RX ADMIN — LEVETIRACETAM 500 MG: 100 INJECTION, SOLUTION INTRAVENOUS at 23:39

## 2023-01-01 RX ADMIN — QUETIAPINE FUMARATE 25 MG: 25 TABLET ORAL at 09:15

## 2023-01-01 RX ADMIN — WATER 1000 MG: 1 INJECTION INTRAMUSCULAR; INTRAVENOUS; SUBCUTANEOUS at 16:52

## 2023-01-01 RX ADMIN — DEXMEDETOMIDINE HYDROCHLORIDE 0.4 MCG/KG/HR: 400 INJECTION, SOLUTION INTRAVENOUS at 00:45

## 2023-01-01 RX ADMIN — ACETAMINOPHEN 1000 MG: 500 TABLET ORAL at 06:00

## 2023-01-01 RX ADMIN — SODIUM CHLORIDE, POTASSIUM CHLORIDE, SODIUM LACTATE AND CALCIUM CHLORIDE 500 ML: 600; 310; 30; 20 INJECTION, SOLUTION INTRAVENOUS at 06:40

## 2023-01-01 RX ADMIN — LACTULOSE 20 G: 10 SOLUTION ORAL at 14:02

## 2023-01-01 RX ADMIN — DEXMEDETOMIDINE HYDROCHLORIDE 0.8 MCG/KG/HR: 400 INJECTION, SOLUTION INTRAVENOUS at 13:00

## 2023-01-01 RX ADMIN — ACETAMINOPHEN 1000 MG: 500 TABLET ORAL at 14:08

## 2023-01-01 RX ADMIN — AZITHROMYCIN MONOHYDRATE 500 MG: 500 INJECTION, POWDER, LYOPHILIZED, FOR SOLUTION INTRAVENOUS at 16:35

## 2023-01-01 RX ADMIN — DEXMEDETOMIDINE HYDROCHLORIDE 0.8 MCG/KG/HR: 400 INJECTION, SOLUTION INTRAVENOUS at 05:36

## 2023-01-01 RX ADMIN — PROPOFOL 20 MG: 10 INJECTION, EMULSION INTRAVENOUS at 13:00

## 2023-01-01 RX ADMIN — AMIODARONE HYDROCHLORIDE 150 MG: 50 INJECTION, SOLUTION INTRAVENOUS at 17:20

## 2023-01-01 RX ADMIN — MIDAZOLAM HYDROCHLORIDE 2 MG: 2 INJECTION, SOLUTION INTRAMUSCULAR; INTRAVENOUS at 11:15

## 2023-01-01 RX ADMIN — SODIUM CHLORIDE 1000 ML: 9 INJECTION, SOLUTION INTRAVENOUS at 16:41

## 2023-01-01 RX ADMIN — OXYCODONE HYDROCHLORIDE 5 MG: 5 SOLUTION ORAL at 21:02

## 2023-01-01 RX ADMIN — PERFLUTREN 1 ML: 6.52 INJECTION, SUSPENSION INTRAVENOUS at 18:36

## 2023-01-01 RX ADMIN — LACTULOSE 20 G: 10 SOLUTION ORAL at 21:13

## 2023-01-01 RX ADMIN — DEXMEDETOMIDINE HYDROCHLORIDE 1 MCG/KG/HR: 400 INJECTION, SOLUTION INTRAVENOUS at 01:44

## 2023-01-01 RX ADMIN — LEVETIRACETAM 500 MG: 100 INJECTION, SOLUTION INTRAVENOUS at 12:00

## 2023-01-01 RX ADMIN — Medication: at 14:04

## 2023-01-01 RX ADMIN — PROPOFOL 30 MCG/KG/MIN: 10 INJECTION, EMULSION INTRAVENOUS at 22:08

## 2023-01-01 RX ADMIN — DEXMEDETOMIDINE HYDROCHLORIDE 1 MCG/KG/HR: 400 INJECTION, SOLUTION INTRAVENOUS at 11:24

## 2023-01-01 RX ADMIN — QUETIAPINE FUMARATE 25 MG: 25 TABLET ORAL at 21:36

## 2023-01-01 RX ADMIN — FUROSEMIDE 20 MG: 10 INJECTION, SOLUTION INTRAMUSCULAR; INTRAVENOUS at 21:13

## 2023-01-01 RX ADMIN — CHLORHEXIDINE GLUCONATE 15 ML: 1.2 RINSE ORAL at 22:06

## 2023-01-01 RX ADMIN — ENOXAPARIN SODIUM 90 MG: 100 INJECTION SUBCUTANEOUS at 09:02

## 2023-01-01 RX ADMIN — CHLORHEXIDINE GLUCONATE 15 ML: 1.2 RINSE ORAL at 21:08

## 2023-01-01 RX ADMIN — Medication: at 22:05

## 2023-01-01 RX ADMIN — CHLORHEXIDINE GLUCONATE 15 ML: 1.2 RINSE ORAL at 09:16

## 2023-01-01 RX ADMIN — SODIUM CHLORIDE, PRESERVATIVE FREE 20 MG: 5 INJECTION INTRAVENOUS at 20:40

## 2023-01-01 RX ADMIN — DEXMEDETOMIDINE HYDROCHLORIDE 0.8 MCG/KG/HR: 400 INJECTION, SOLUTION INTRAVENOUS at 23:25

## 2023-01-01 RX ADMIN — CHLORHEXIDINE GLUCONATE 15 ML: 1.2 RINSE ORAL at 21:51

## 2023-01-01 RX ADMIN — SODIUM CHLORIDE, PRESERVATIVE FREE 20 MG: 5 INJECTION INTRAVENOUS at 21:13

## 2023-01-01 RX ADMIN — ENOXAPARIN SODIUM 40 MG: 100 INJECTION SUBCUTANEOUS at 09:34

## 2023-01-01 RX ADMIN — LEVETIRACETAM 1000 MG: 100 INJECTION, SOLUTION, CONCENTRATE INTRAVENOUS at 00:51

## 2023-01-01 RX ADMIN — QUETIAPINE FUMARATE 25 MG: 25 TABLET ORAL at 09:37

## 2023-01-01 RX ADMIN — LEVETIRACETAM 250 MG: 100 INJECTION, SOLUTION INTRAVENOUS at 00:24

## 2023-01-01 RX ADMIN — SODIUM CHLORIDE, PRESERVATIVE FREE 10 ML: 5 INJECTION INTRAVENOUS at 21:36

## 2023-01-01 RX ADMIN — LEVETIRACETAM 250 MG: 100 INJECTION, SOLUTION INTRAVENOUS at 11:50

## 2023-01-01 RX ADMIN — Medication 6 MG: at 21:13

## 2023-01-01 RX ADMIN — ACETAMINOPHEN 1000 MG: 500 TABLET ORAL at 14:05

## 2023-01-01 RX ADMIN — LEVETIRACETAM 500 MG: 100 INJECTION, SOLUTION INTRAVENOUS at 11:27

## 2023-01-01 RX ADMIN — DEXMEDETOMIDINE HYDROCHLORIDE 1 MCG/KG/HR: 400 INJECTION, SOLUTION INTRAVENOUS at 18:15

## 2023-01-01 RX ADMIN — DEXMEDETOMIDINE HYDROCHLORIDE 0.6 MCG/KG/HR: 400 INJECTION, SOLUTION INTRAVENOUS at 00:47

## 2023-01-01 RX ADMIN — ACETAMINOPHEN 1000 MG: 500 TABLET ORAL at 21:13

## 2023-01-01 RX ADMIN — SODIUM CHLORIDE, POTASSIUM CHLORIDE, SODIUM LACTATE AND CALCIUM CHLORIDE: 600; 310; 30; 20 INJECTION, SOLUTION INTRAVENOUS at 19:46

## 2023-01-01 RX ADMIN — FUROSEMIDE 20 MG: 10 INJECTION, SOLUTION INTRAMUSCULAR; INTRAVENOUS at 14:41

## 2023-01-01 RX ADMIN — POTASSIUM CHLORIDE 10 MEQ: 7.46 INJECTION, SOLUTION INTRAVENOUS at 08:00

## 2023-01-01 RX ADMIN — LEVETIRACETAM 500 MG: 100 INJECTION, SOLUTION INTRAVENOUS at 12:30

## 2023-01-01 RX ADMIN — ENOXAPARIN SODIUM 90 MG: 100 INJECTION SUBCUTANEOUS at 08:15

## 2023-01-01 RX ADMIN — ACETAMINOPHEN 1000 MG: 500 TABLET ORAL at 21:02

## 2023-01-01 RX ADMIN — PROPOFOL 20 MCG/KG/MIN: 10 INJECTION, EMULSION INTRAVENOUS at 16:16

## 2023-01-01 RX ADMIN — ALBUMIN (HUMAN): 12.5 INJECTION, SOLUTION INTRAVENOUS at 14:54

## 2023-01-01 RX ADMIN — Medication 25 MG: at 18:16

## 2023-01-01 RX ADMIN — DEXMEDETOMIDINE HYDROCHLORIDE 0.6 MCG/KG/HR: 400 INJECTION, SOLUTION INTRAVENOUS at 15:36

## 2023-01-01 RX ADMIN — DEXMEDETOMIDINE HYDROCHLORIDE 0.8 MCG/KG/HR: 400 INJECTION, SOLUTION INTRAVENOUS at 07:09

## 2023-01-01 RX ADMIN — ALBUMIN (HUMAN) 25 G: 12.5 INJECTION, SOLUTION INTRAVENOUS at 11:15

## 2023-01-01 ASSESSMENT — PAIN SCALES - GENERAL
PAINLEVEL_OUTOF10: 0
PAINLEVEL_OUTOF10: 10
PAINLEVEL_OUTOF10: 0
PAINLEVEL_OUTOF10: 5
PAINLEVEL_OUTOF10: 0
PAINLEVEL_OUTOF10: 8
PAINLEVEL_OUTOF10: 0
PAINLEVEL_OUTOF10: 10
PAINLEVEL_OUTOF10: 0

## 2023-01-01 ASSESSMENT — PULMONARY FUNCTION TESTS
PIF_VALUE: 16
PIF_VALUE: 18
PIF_VALUE: 24
PIF_VALUE: 16
PIF_VALUE: 18
PIF_VALUE: 21
PIF_VALUE: 14
PIF_VALUE: 19
PIF_VALUE: 14
PIF_VALUE: 20
PIF_VALUE: 19
PIF_VALUE: 19
PIF_VALUE: 17
PIF_VALUE: 15
PIF_VALUE: 19
PIF_VALUE: 17
PIF_VALUE: 17
PIF_VALUE: 14
PIF_VALUE: 19
PIF_VALUE: 17
PIF_VALUE: 25
PIF_VALUE: 17
PIF_VALUE: 19
PIF_VALUE: 17
PIF_VALUE: 20
PIF_VALUE: 16

## 2023-07-01 ENCOUNTER — TELEPHONE (OUTPATIENT)
Age: 78
End: 2023-07-01

## 2023-07-27 PROBLEM — I46.9 CARDIAC ARREST (HCC): Status: ACTIVE | Noted: 2023-01-01

## 2023-07-27 NOTE — ED NOTES
TRANSFER - OUT REPORT:    Verbal report given to [facility] RJ BANSAL RN on Jeremy Loza  being transferred to CCU for routine progression of patient care       Report consisted of patient's Situation, Background, Assessment and   Recommendations(SBAR). Information from the following report(s) Nurse Handoff Report, ED Encounter Summary, ED SBAR, [facility], and Recent Results was reviewed with the receiving nurse. Kinder Fall Assessment:                           Lines:   Peripheral IV 07/27/23 Right;Dorsal Forearm (Active)       Peripheral IV 07/27/23 Proximal;Right;Dorsal Forearm (Active)   Site Assessment Clean, dry & intact 07/27/23 1747   Line Status Blood return noted 07/27/23 [address] changed 07/27/23 1747   Phlebitis Assessment No symptoms 07/27/23 1747   Infiltration Assessment 0 07/27/23 1747   Dressing Status New dressing applied;Clean, dry & intact 07/27/23 1747   Dressing Type Transparent 07/27/23 1747   Dressing Intervention New 07/27/23 1747       Peripheral IV 07/27/23 Left;Dorsal Hand (Active)   Site Assessment Clean, dry & intact 07/27/23 1825   Line Status Blood return noted 07/27/23 1825   Phlebitis Assessment No symptoms 07/27/23 1825   Infiltration Assessment 0 07/27/23 1825   Dressing Status New dressing applied;Clean, dry & intact 07/27/23 1825   Dressing Type Transparent 07/27/23 1825   Dressing Intervention New 07/27/23 1825        Opportunity for questions and clarification was provided.       Patient transported with:  RNs, respiratory, monitor, and ventilator          Kendall Merrill RN  07/27/23 5581

## 2023-07-27 NOTE — ED PROVIDER NOTES
[facility] [address] ENCOUNTER      Pt Name: Bahman Garcia  MRN: [id number]  [facility] 1945  Date of evaluation: 7/27/2023  Provider: Bk Hassan MD    CHIEF COMPLAINT       Chief Complaint   Patient presents with    Cardiac Arrest         HISTORY OF PRESENT ILLNESS   (Location/Symptom, Timing/Onset, Context/Setting, Quality, Duration, Modifying Factors, Severity)  Note limiting factors. HPI  Patient is an 49-year-old male that was at the [facility]. When he became unconscious and subsequently collapsed. Patient found to be down and initially bystander arrest.  Initial bystander CPR was initiated. EMS called. Found down for approximately 20 minutes. Started on epi and CPR as well as intubated in the field. And did have epi x2 rounds. Patient did have return of spontaneous circulation. Unable to get other history from the patient as he is GCS of 3T and intubated. Code STEMI initially called. And cardiology did see the patient given concern for V-fib arrest and return of spontaneous regulation. Interventional cardiology at the patient's bedside. Review of External Medical Records:     Nursing Notes were reviewed. REVIEW OF SYSTEMS    (2-9 systems for level 4, 10 or more for level 5)     Review of Systems    Except as noted above the remainder of the review of systems was reviewed and negative. PAST MEDICAL HISTORY     Past Medical History:   Diagnosis Date    ED (erectile dysfunction) 2007    Hearing reduced     Hemorrhoids     Ill-defined condition     IDIOPATHIC HIVES    Recurrent left inguinal hernia 2/10/2013    repaired 2/21/13    Recurrent right inguinal hernia 8/20/2014    Snoring     Squamous cell cancer of scalp and skin of neck 01/2019    Stroke ([address]) 10/2014    POSSIBLE TIA; MRI DONE TODAY;     Vertigo     Vitiligo          SURGICAL HISTORY       Past Surgical History:   Procedure Laterality Date    CHOLECYSTECTOMY  june 1982     Dr. Shalonda Pérez

## 2023-07-27 NOTE — PROCEDURES
Critical care progress note    Patient brought to [facility] emergency room after out-of-hospital cardiac arrest.  Currently intubated. No purposeful movements. Just been initiated on propofol. Concerned about possible STEMI. ECG reviewed and shows no definitive consistent ST elevations. Significant variability of heart rhythm. Underlying intermittent rate related conduction block versus frequent PVCs. Hemodynamically now stable with appropriate systolic blood pressures. ER physician was informed that patient had a V-fib cardiac arrest with defibrillation. Prior to that he had bystander CPR. Exact downtime is unclear. Bedside echocardiogram performed. RV function appears to be hyperdynamic with underfilled right ventricle. Grossly left ventricle function appears normal.  Trace pericardial effusion is noted. In the absence of a convincing ECG for STEMI and especially in light of uncertain neurological status, I do not feel it is appropriate to take the patient to the cardiac catheterization lab as it is unlikely to change long-term outcome. Recommend close monitoring in the ICU. Correction of acidosis, electrolytes as a first-line strategy. May be reasonable to consider predischarge cardiac authorization if he has reasonable neurological recovery. Also on further review of the chart, it appears that he has previously been managed by [place] [facility] Dr. Lamine Renee. We will asked them to see the patient. He has a pacemaker in place and may be a device check will help in further confirming underlying arrhythmia genic event. I personally spent 40 minutes of critical care time. This is time spent at this critically ill patient's bedside / unit / floor actively involved in patient care as well as the coordination of care. This does not include any procedural time which has been billed separately.   I had a face to face encounter with the patient, reviewed and interpreted

## 2023-07-27 NOTE — ED NOTES
Resp and cath lab team at bedside     Christina Roberson RN  07/27/23 1604    Prop started at 4:08       Cardiac physician at bedside; using ultra sound to assess pt's chest     Christina Roberson RN  07/27/23 1611    Family, care management, and pastoral care at bedside     Christina Roberson RN  07/27/23 3860

## 2023-07-27 NOTE — ED TRIAGE NOTES
Pt arrives EMS post cardiac arrest, was at work at [facility] when bystander witnessed cardiac arrest and immediate cpr was performed. 2 rounds Epi en route by EMS and sinus tach on arrival to ER. Pt intubated en route; 25.5 at the teeth.  STEMI on EMS EKG

## 2023-07-28 PROBLEM — G93.40 ENCEPHALOPATHY: Status: ACTIVE | Noted: 2023-01-01

## 2023-07-28 NOTE — CARE COORDINATION
Care Management Initial Assessment       RUR: 10%  Readmission? No  1st IM letter given? Yes - 7/28  1st  letter given: No    CM met with patient's spouse Inocente Mckeon and daughter Vasyl Rivera as patient is currently sedated and ventilated. Patient is admitted after cardiac arrest at the gym. He currently lives with his wife at home. They do not have any DME or Home Health currently. His daughter Vasyl Rivera is a LCSW who previously worked in Care Management with [employer]. She would like to be primary contact for discharge planning and choices for rehab/snf/home health. Marylee Husk, [phone]. Patient and his wife are currently on the wait list for [facility]. Juan Jose Meyer, wife, [phone].        07/28/23 1713   Service Assessment   Patient Orientation Sedated   History Provided By Child/Family   Primary Caregiver Spouse   Accompanied By/Relationship Wife, Inocente Mckeon and daughter, Divya Mcarthur Other;Children;Family Members   Patient's Healthcare Decision Maker is: Named in [address]    PCP Verified by CM Yes   Last Visit to PCP Within last year   Prior Functional Level Independent in ADLs/IADLs   Can patient return to prior living arrangement Unknown at present   Ability to make needs known: Good   Family able to assist with home care needs: Yes   Would you like for me to discuss the discharge plan with any other family members/significant others, and if so, who?  Yes  (wife and daughter)   Financial Resources Medicare   Community Resources None   CM/SW Referral ADLs/IADLs;DME   Social/Functional History   Lives With Spouse   Type of [address] Help From Family   ADL Assistance Independent   Homemaking Assistance Independent   Ambulation Assistance Independent   Transfer Assistance Independent   Active  Yes   Mode of Transportation Car;Family   Occupation Retired   Discharge Planning   Type of Residence [facility];Acute Rehab

## 2023-07-29 NOTE — PROCEDURES
[facility]  EEG    Name:  Natali Madrid  MR#:  [id number]  :  1945  ACCOUNT #:  [de-identified]  DATE OF SERVICE:  2023    REQUESTING PHYSICIAN:  Dr. Nadja Sutton    HISTORY:  The patient is an 78-year-old male, who is being evaluated for altered mental status, status post cardiac arrest.    DESCRIPTION:  This is an 18-channel EEG performed on a poorly responsive patient. The dominant posterior background rhythm consists of medium voltage rhythms in the 6-7 Hz frequency range out of the posterior head region. Intermittently slower frequencies ranging from high delta to low theta range were seen. Stimulation of the patient produced reactivity, i.e., change in the background amplitudes and frequencies. Photic stimulation did not elicit driving response. EEG SUMMARY:  Abnormal EEG due to mild-to-moderate slowing of the background rhythms. CLINICAL INTERPRETATION:  This EEG is suggestive of a moderate generalized encephalopathic process, nonspecific in type. This may be related to an underlying structural brain injury from hypoxia/ischemia and/or toxic/metabolic abnormality. No epileptiform activity was seen and no electrographic seizures were recorded.       Feliciano Carrion MD      [id number]  D:  2023 15:27  T:  2023 21:45  JOB #:  [id number]

## 2023-07-30 PROBLEM — R56.9 SEIZURE-LIKE ACTIVITY (HCC): Status: ACTIVE | Noted: 2023-01-01

## 2023-07-30 NOTE — PROCEDURES
PROCEDURE: RAPID EEG  NAME:   Fer Meeks  ACCOUNT NUMBER : [de-identified]  MRN:   [id number]  DATE OF SERVICE: 7/28/23    HISTORY/INDICATION: Pt is an 77yo male admitted with out of hospital cardiac arrest with ROSC in 20 minutes, had witnessed seizure like activity, given fentanyl and ativan and rapid EEG started. CONDITIONS OF RECORDING: This EEG was obtained using a 10 lead, 8 channel system positioned circumferentially without any parasagittal coverage (rapid EEG). Computer selected EEG is reviewed as well as background features and all clinically significant events. Clarity algorithm utilized and implemented to provide analysis of underlying activity and seizure detection used to facilitate reading. This study began on 7/28/23 at 2240 and ended on 7/29/23 at 0102. I have reviewed the study in its entirety. DESCRIPTION:   Background consists of a mixture of theta, delta, and beta activity. Amplitudes appear symmetric. Within the limitations of the study, there are no clear or well-formed electrographic seizures. At the time of the highest AI interpretation of \"seizure\", there is intermittent beta activity seen, suggestive of sleep. INTERPRETATION: This rapid EEG does not show any clear or well-formed seizures within the confines and limitations of the study. Note the absence of electrographic seizures does not rule out the diagnosis of a seizure disorder. Clinical correlation is advised. If clinical suspicion still persists, would recommend continuous EEG monitoring with a 10-20 international electrode system for improved spatial resolution and parasagittal coverage.     Computer generated seizure burden <50%    Ninette Nageotte, MD

## 2023-07-31 PROBLEM — Z51.5 PALLIATIVE CARE ENCOUNTER: Status: ACTIVE | Noted: 2023-01-01

## 2023-07-31 PROBLEM — R41.0 CONFUSION: Status: ACTIVE | Noted: 2023-01-01

## 2023-07-31 PROBLEM — R45.1 AGITATION: Status: ACTIVE | Noted: 2023-01-01

## 2023-07-31 NOTE — CARE COORDINATION
Transition of Care Plan:  -anticipate SNF--referral sent to [address] [place] [address] Street: 11%--low  Prior Level of Functioning: independent  Disposition: Anticipate discharge to SNF pending medical progress  If SNF or IPR: Date FOC offered: 7/31/2023  Date FOC received: 7/31/2023  Accepting facility:   Date authorization started with reference number:   Date authorization received and expires: Follow up appointments:   DME needed: None noted  Transportation at discharge: BLS  IM/IMM Medicare/ letter given: 07/28/2023  Caregiver Contact: Freda Aguilera, wife, [address], daughter, [phone]  Discharge Caregiver contacted prior to discharge? Daughter is primary contact  Care Conference needed? no  Barriers to discharge:  bed availability, insurance authorization    Patient admitted 7/27 after suffering cardiac arrest while at the gym. Patient was extubated yesterday. He has been agitated and having difficulty following commands. Plan for FEES today. PT, OT and Speech are following. Palliative has met with family. Family is requesting [facility] for SNF. CM submitted referral via [address]. Still awaiting recommendations from PT/OT.     Zoya Booth, [address] [facility]  [phone]

## 2023-07-31 NOTE — PROCEDURES
0730 Report received Mely Pelayo RN. Precedex gtt stopped. Pt remains restless and confused. Pt able to verbalize name but disoriented to place, time, situation. Delayed responses. 0830 Updated spouse @bedside. Spouse concerned pt is more confused. Pt Aox1 with no other overt neurological deficits, HURD. Made Neurology aware. 0900 Intensivist @bedside assessing pt with spouse. Plans for diuresis, establish Cardiology plan, therapies. 0930 SLP @bedside. Plans for FEES later today. 0945 PT @bedside. 1000 Cardiology NP @bedside. 1030 Labs to assess liver function obtained and sent. 1100 SLP performing bedside FEES procedure. Pt became combative, biting and spitting. Pt unable to be redirected. FEES procedure aborted. Neurologist @bedside. Keppra dose decreased, PRN Risperdal ordered. 1130 ABGs obtained and reviewed with MD. pO2 43; pCO2 29.8; pH 7.552;  HCO3 24.3, %O2 Sat 84.3. RT placed pt on HFNC 40L 100%. Additional Lasix IVP administered, CXR ordered. Pt pulling at O2 cannula attempted to get out of bed, restraints applied, PRN Morphine administered, Precedex gtt restarted. 1200 Pt responding well to IV Lasix. UO >1000cc since first dose. Bladder scan <200cc. 1300 CXR @bedside. 1430 Pt resting comfortably in bed.     1600 Pt febrile, 101.2. Made Dr. Francisco Mello aware. Orders for respiratory panel and culture, blood cultures, UA, IV Abx. Made pt's spouse and daughter aware. 1630 Palliative care team @bedside for introduction. 1700 Labs obtained and sent, IV Abx to follow. 1930 Bedside and Verbal shift change report given to RN (oncoming nurse) by Ani Aviles RN (offgoing nurse). Report included the following information Nurse Handoff Report, Intake/Output, MAR, Recent Results, and Cardiac Rhythm NSR PVCs .

## 2023-08-02 NOTE — CARE COORDINATION
Transition of Care Plan:  -anticipate SNF--referral sent to [address] [place] [address] 43: 11%--low  Prior Level of Functioning: independent  Disposition: Anticipate discharge to SNF pending medical progress  If SNF or IPR: Date FOC offered: 7/31/2023  Date FOC received: 7/31/2023  Accepting facility:   Date authorization started with reference number:   Date authorization received and expires: Follow up appointments: cardiology, PCP  DME needed: None noted  Transportation at discharge: BLS  IM/IMM Medicare/ letter given: 07/28/2023  Caregiver Contact: Allison Aldana, wife, [address], daughter, [phone]  Discharge Caregiver contacted prior to discharge? Daughter is primary contact  Care Conference needed? no  Barriers to discharge:  clinical progress, bed availability, insurance authorization     Patient admitted 7/27 after suffering cardiac arrest while at the gym. He has been agitated and having difficulty following commands. Family is requesting [facility] for SNF. CM uploaded PT/OT notes as well as recent attending note to [address] CAMERON provided handoff to CVICU CAMERON.      Beverly Mora, [address] [facility]  [phone]

## 2023-08-03 NOTE — CONSULTS
[facility]  Cardiology Care Note                  [x]Initial visit     []Established visit     Patient Name: Ayde Baer  Primary Cardiologist: none  Consulting Cardiologist: Mike Machado MD       HPI:   Mr. Flaca Lantigua is an 78 year male who presents to [facility] ER via EMS after cardiac arrest while exercising at [facility] gym. His wife reports that he worked out of doors in the heat during the morning, then ate lunch and napped, then went to the gym for his usual daily workout. He was witnessed to collapse and bystander CPR was initiated almost immediately. Initial rhythm upon EMS arrival is reported to have been VF. He underwent 2 rounds of ACLS with epi and defibrillation before ROSC. Time to ROSC estimated to be 20 minutes. In the ED, rhythm was sinus with frequent ventricular ectopy. Code STEMI was called but then cancelled. Pt intubated, no purposeful movements in ER. Was on propofol for sedation. ECG in ER  reviewed and shows no definitive consistent ST elevations. Significant variability of heart rhythm. Underlying intermittent rate related conduction block versus frequent PVCs. Hemodynamically now stable with appropriate systolic blood pressures. Bedside echocardiogram performed. RV function appears to be hyperdynamic with underfilled right ventricle. Grossly left ventricle function appears normal.  Trace pericardial effusion is noted. In the absence of a convincing ECG for STEMI and especially in light of uncertain neurological status, I do not feel it is appropriate to take the patient to the cardiac catheterization lab as it is unlikely to change long-term outcome. Recommend close monitoring in the ICU. Correction of acidosis, electrolytes as a first-line strategy.   May be reasonable to consider predischarge cardiac authorization if he has reasonable neurological
Neurology Consult Note     NAME: Martinez Sheth   :  1945   MRN:  [id number]   DATE:  2023       HPI:  Pt is an 79yo male admitted 23 with cardiac arrest while at [facility], initially in VF with EMS, 20 minutes until ROSC. Pt intubated and on propofol until 23 at 0100 and precedex until 23 at [address]. Last night had possible seizure activity and received fentanyl 50mcg and Ativan 4mg 23 at 2330. Seizure activity described as myoclonic jerking, eyelid blinking, and nystagmus. He was started on Keppra and had Ceribell placed. Josue Mookie reviewed, no seizure activity seen, AI seizure burden was less than 50% and at peak the pt appears to have transitioned to sleep. CTH neg. CBC with diff - normal. CMP - Glu 253, BUN/Cr 29/1. 86. CMP today with BUN 22, Cr 1.22, ALT 84/AST 66. MRI Brain pending.      PMH:  Hearing impairment  H/o vertigo  Possible TIA vs CVA  SCC of scalp and neck  ED  Hemorrhoids s/p surgery  Bilateral inguinal hernia repair  S/p radha  S/p vasectomy    ROS:  Unable to assess due to patient factors    MEDS:  Home:  Current Outpatient Medications   Medication Instructions    Dupixent 300 mg, SubCUTAneous, EVERY 14 DAYS    sildenafil (VIAGRA) 100 mg, Oral, PRN         Current Facility-Administered Medications:     enoxaparin (LOVENOX) injection 90 mg, 1 mg/kg, SubCUTAneous, Q12H, Marleen Cruz MD, 90 mg at 23    amiodarone (CORDARONE) tablet 400 mg, 400 mg, Oral, BID, Thaddeus Nguyễn MD, 400 mg at 23    sodium phosphate 30 mmol in sodium chloride 0.9 % 250 mL IVPB, 30 mmol, IntraVENous, Once, Thaddeus Nguyễn MD, Last Rate: 62.5 mL/hr at 23 09, 30 mmol at 23    dexmedetomidine (PRECEDEX) 400 mcg in sodium chloride 0.9 % 100 mL infusion, 0.1-1.5 mcg/kg/hr, IntraVENous,
Palliative Medicine Consult  [place]: [phone] ([phone])    Patient Name: Macrina Rodriguez  YOB: 1945    Date of Initial Consult: 7/31/23  Date of Today's Visit: 8/3/2023  Reason for Consult: psychosocial support   Requesting Provider: Akshat Irizarry      SUMMARY:   Macrina Rodriguez is a 78 y.o. without significant pmhx, very active at baseline who was admitted on 7/27/2023 via EMS after having cardiac arrest at [address] - got immediate CPR by [facility] and then a trauma RN who was working out, utilized AED, ROSC ~ 20 min. Intubated, was able to be extubated 7/30. Cardiology eval, no cath for now but to consider LHC prior to d/c depending on mental status. . Echo w/EF 50-55%. Neurology evaluated given concern for anoxic brain injury. MRI brain 7/30 w/ severe chronic microvascular disease, no evidence of acute infarct but Neurology to discuss this read further w/ radiology. ? possible small punctate infracts. Would not explain the level of agitation he has today, per Neurology. 8/3- Pt remains very agitated, confused - able to follow simple commands intermittently. Goals of care conversation held yesterday by attending. Current medical issues leading to Palliative Medicine involvement include:   Support. Social: Pt  to [place]. Dtrs are [facility]. PALLIATIVE DIAGNOSES:   Cardiac arrest   Encephalopathy, confusion ?anoxic brain injury   Agitation   Feeding difficulties   Shortness of breath-difficulty managing secretions   Palliative care encounter        PLAN:   Discussed pt care w/ intensivist Dr Maine Calderón who held family meeting yesterday discussing pt's agitation/confusion. Could be anoxic brain injury even w/out findings on MRI brain and also there's concern that had underlying neuro process/cognitive changes that were uncovered during cardiac arrest- as pt w/ severe chronic microvascular changes.  Pt now partial code- no CPR/shock or intubation in event of
Palliative Medicine Consult  [place]: [phone] ([phone])    Patient Name: Tyrone Mike  YOB: 1945    Date of Initial Consult: 7/31/23  Date of Today's Visit: 7/31/2023  Reason for Consult: psychosocial support   Requesting Provider: Mark Bradley      SUMMARY:   Tyrone Mike is a 78 y.o. without significant pmhx, very active at baseline who was admitted on 7/27/2023 via EMS after having cardiac arrest at [address] - got immediate CPR by [facility] and then a trauma RN who was working out, utilized AED, ROSC ~ 20 min. Intubated, was able to be extubated 7/30. Cardiology eval, no cath for now but to consider LHC prior to d/c depending on mental status. . Echo w/EF 50-55%. Neurology evaluated given concern for anoxic brain injury. MRI brain 7/30 w/ severe chronic microvascular disease, no evidence of acute infarct but Neurology to discuss this read further w/ radiology. ? possible small punctate infracts. Would not explain the level of agitation he has today, per Neurology. Current medical issues leading to Palliative Medicine involvement include:   Support. Social: Pt  to [place]. Dtrs are [facility]. PALLIATIVE DIAGNOSES:   Cardiac arrest   Encephalopathy, confusion   Palliative care encounter        PLAN:   Our team consulted for psychosocial support for family given the acute events. We have been in contact w/ dtr Crista Taveras, who used to work at [address] [place] [address]. Pt w/ very supportive family. At baseline was very active, worked out daily. Brief introduction of our team to wife Quinn, who is overwhelmed w/ information and notes how difficult it is to see pt so agitated today. Neurology just at bedside, continuing work up for delirium. LCSW Jonh Mills to f/up more w/ wife later today for support. *Please note for that decision making, AMD on file- it is good to have support of one of the dtrs in addition to mPOA wife.    Initial consult note routed to primary
COMPUND FX LEFT FOREARM W/PIN AND SCREW    OTHER SURGICAL HISTORY      VASECTOMY    REMOVAL OF SPERM CORD LESION  12-28-10    Dr. Lisa Rodney. Mike Lulas HERNIA,5+Y/O,REDUCIBL  12-    Dr. Wayne Finley      Family History   Problem Relation Age of Onset    Stroke Father         started age 80    Hypertension Sister     Cancer Sister         Bone    Cancer Daughter         MELANOMA    Anesth Problems Neg Hx     Stroke Mother         aneurysm - hemorrhagic stroke    Hypertension Father       History reviewed, no pertinent family history. Social History     Tobacco Use    Smoking status: Never    Smokeless tobacco: Never   Substance Use Topics    Alcohol use:  Yes     Allergies   Allergen Reactions    Amoxicillin Hives      Current Facility-Administered Medications   Medication Dose Route Frequency    glycopyrrolate (ROBINUL) injection 0.2 mg  0.2 mg IntraVENous Q5 Min PRN    lactulose (CHRONULAC) 10 GM/15ML solution 20 g  20 g Oral TID    QUEtiapine (SEROQUEL) tablet 25 mg  25 mg Oral BID    enoxaparin (LOVENOX) injection 40 mg  40 mg SubCUTAneous Daily    furosemide (LASIX) injection 20 mg  20 mg IntraVENous TID    levETIRAcetam (KEPPRA) injection 500 mg  500 mg IntraVENous Q12H    cefTRIAXone (ROCEPHIN) 1,000 mg in sterile water 10 mL IV syringe  1,000 mg IntraVENous Q24H    azithromycin (ZITHROMAX) 500 mg in sodium chloride 0.9 % 250 mL IVPB ([id number])  500 mg IntraVENous Q24H    balsum peru-castor oil (VENELEX) ointment   Topical BID    amiodarone (CORDARONE) tablet 400 mg  400 mg Oral BID    acetaminophen (TYLENOL) tablet 1,000 mg  1,000 mg Oral 3 times per day    melatonin tablet 6 mg  6 mg Oral Nightly PRN    dexmedetomidine (PRECEDEX) 400 mcg in sodium chloride 0.9 % 100 mL infusion  0.1-1.5 mcg/kg/hr IntraVENous Continuous    sodium phosphate 15 mmol in sodium chloride 0.9 % 250 mL IVPB  15 mmol IntraVENous PRN    hydrALAZINE (APRESOLINE) injection 10 mg  10 mg IntraVENous Q6H PRN    sodium chloride flush

## 2023-08-03 NOTE — ACP (ADVANCE CARE PLANNING)
his neuro status is improving. The hope is that this would be an ongoing discussion between the care team and the family. The family would like to give him every opportunity to recover. However should Mr. Patricia Menon suffer another cardiac arrest, the family has declined CPR. The family was able to verbalize understanding of the plan. All parties were in agreement. While we hope for the best outcome for Mr. Patricia Menon, we also understand this may not happen. We will continue to provide support to both patient and family during this difficult time. TICO Gong MD, A  CVICU Intensivist    ACP Time: 30 minutes

## 2023-08-04 NOTE — PROCEDURES
PROCEDURE: RAPID EEG  NAME:   Marissa Smart  ACCOUNT NUMBER : [de-identified]  MRN:   [id number]  DATE OF SERVICE: 8/3/23    HISTORY/INDICATION: Pt is an 77yo male with cardiac arrest, now extubated, with ongoing mental status changes. Rapid EEG ordered to assess for underlying seizure activity. CONDITIONS OF RECORDING: This EEG was obtained using a 10 lead, 8 channel system positioned circumferentially without any parasagittal coverage (rapid EEG). Computer selected EEG is reviewed as well as background features and all clinically significant events. Clarity algorithm utilized and implemented to provide analysis of underlying activity and seizure detection used to facilitate reading. This study began on 8/3/23 at 1435 and ended on 8/3/23 at 1547. I have reviewed the study in its entirety. DESCRIPTION:   Background consists of a mixture of alpha, theta, delta, and beta activity. Amplitudes appear symmetric. Significant muscle and lead artifact is seen throughout the recording. Within the limitations of the study, there are no clear or well-formed electrographic seizures. INTERPRETATION: This rapid EEG does not show any clear or well-formed seizures within the confines and limitations of the study. Note the absence of electrographic seizures does not rule out the diagnosis of a seizure disorder. Clinical correlation is advised. If clinical suspicion still persists, would recommend continuous EEG monitoring with a 10-20 international electrode system for improved spatial resolution and parasagittal coverage.     Computer generated seizure burden 0%    Jose Richards MD

## 2023-08-04 NOTE — PLAN OF CARE
Problem: Discharge Planning  Goal: Discharge to home or other facility with appropriate resources  7/27/2023 2050 by Christophe Montoya RN  Outcome: Progressing  7/27/2023 1957 by Gen Vergara RN  Outcome: Progressing  Flowsheets (Taken 7/27/2023 1955)  Discharge to home or other facility with appropriate resources: Identify barriers to discharge with patient and caregiver     Problem: Pain  Goal: Verbalizes/displays adequate comfort level or baseline comfort level  7/27/2023 2050 by Christophe Montoya RN  Outcome: Progressing  7/27/2023 1957 by Gen Vergara RN  Outcome: Progressing  Flowsheets (Taken 7/27/2023 1915)  Verbalizes/displays adequate comfort level or baseline comfort level:   Encourage patient to monitor pain and request assistance   Assess pain using appropriate pain scale   Administer analgesics based on type and severity of pain and evaluate response   Implement non-pharmacological measures as appropriate and evaluate response   Consider cultural and social influences on pain and pain management     Problem: Safety - Medical Restraint  Goal: Remains free of injury from restraints (Restraint for Interference with Medical Device)  Description: INTERVENTIONS:  1. Determine that other, less restrictive measures have been tried or would not be effective before applying the restraint  2. Evaluate the patient's condition at the time of restraint application  3. Inform patient/family regarding the reason for restraint  4.  Q2H: Monitor safety, psychosocial status, comfort, nutrition and hydration  7/27/2023 2050 by Christophe Montoya RN  Outcome: Progressing  7/27/2023 1957 by Gen Vergara RN  Outcome: Progressing  Flowsheets (Taken 7/27/2023 1928)  Remains free of injury from restraints (restraint for interference with medical device):   Determine that other, less restrictive measures have been tried or would not be effective before applying the restraint   Evaluate the patient's
Problem: Occupational Therapy - Adult  Goal: By Discharge: Performs self-care activities at highest level of function for planned discharge setting. See evaluation for individualized goals. Description: FUNCTIONAL STATUS PRIOR TO ADMISSION:  Patient was ambulatory using no DME and was independent for ADLs/IADLs including driving and working out regularly at [employer]. Patient enjoys working in the yard/gardening. HOME SUPPORT: Patient lived with his wife but didn't require assistance. Occupational Therapy Goals  Initiated 7/31/2023   1. Patient will perform grooming sitting EOB with Minimal Assist within 7 day(s). 2.  Patient will perform lower body dressing with Maximal Assist within 7 day(s). 3.  Patient will perform anterior bathing from neck to thighs with Minimal Assist within 7 day(s). 4.  Patient will perform toilet transfers with Maximal Assist within 7 day(s). 5.  Patient will perform all aspects of toileting with Maximal Assist within 7 day(s). 6.  Patient will participate in upper extremity therapeutic exercise/activities with Minimal Assist within 7 day(s). 7.  Patient will utilize energy conservation techniques during functional activities with verbal and visual cues within 7 day(s). 8.  Patient will participate in [facility] assessment in [facility] for ADLs within 7 days. Outcome: Progressing   OCCUPATIONAL THERAPY TREATMENT  Patient: Daniel Junior (86 y.o. male)  Date: 8/1/2023  Primary Diagnosis: Cardiac arrest [facility]) [I46.9]       Precautions: Fall Risk                Chart, occupational therapy assessment, plan of care, and goals were reviewed. ASSESSMENT  Patient continues to benefit from skilled OT services and is slowly progressing towards goals. Patient presents this session with intermittent rigidity/resistiveness, impaired command following, poor attention, lethargy, impaired sitting balance, and requiring constant support for sitting balance this date.  Patient
Problem: Pain  Goal: Verbalizes/displays adequate comfort level or baseline comfort level  Outcome: Progressing     Problem: Safety - Medical Restraint  Goal: Remains free of injury from restraints (Restraint for Interference with Medical Device)  Description: INTERVENTIONS:  1. Determine that other, less restrictive measures have been tried or would not be effective before applying the restraint  2. Evaluate the patient's condition at the time of restraint application  3. Inform patient/family regarding the reason for restraint  4. Q2H: Monitor safety, psychosocial status, comfort, nutrition and hydration  Outcome: Progressing     Problem: Chronic Conditions and Co-morbidities  Goal: Patient's chronic conditions and co-morbidity symptoms are monitored and maintained or improved  Outcome: Progressing     Problem: Confusion  Goal: Confusion, delirium, dementia, or psychosis is improved or at baseline  Description: INTERVENTIONS:  1. Assess for possible contributors to thought disturbance, including medications, impaired vision or hearing, underlying metabolic abnormalities, dehydration, psychiatric diagnoses, and notify attending LIP  2. [place] high risk fall precautions, as indicated  3. Provide frequent short contacts to provide reality reorientation, refocusing and direction  4. Decrease environmental stimuli, including noise as appropriate  5. Monitor and intervene to maintain adequate nutrition, hydration, elimination, sleep and activity  6. If unable to ensure safety without constant attention obtain sitter and review sitter guidelines with assigned personnel  7.  Initiate Psychosocial CNS and Spiritual Care consult, as indicated  Outcome: Progressing
Problem: Physical Therapy - Adult  Goal: By Discharge: Performs mobility at highest level of function for planned discharge setting. See evaluation for individualized goals. Description: FUNCTIONAL STATUS PRIOR TO ADMISSION: Patient was independent and active without use of DME, driving, exercising at the gym and [employer]    [address] [place] [address]: The patient lived with his wife but did not require assistance. Physical Therapy Goals  Initiated 7/31/2023  1. Patient will move from supine to sit and sit to supine, scoot up and down, and roll side to side in bed with minimal assistance within 7 day(s). 2.  Patient will perform sit to stand with minimal assistance within 7 day(s). 3.  Patient will transfer from bed to chair and chair to bed with minimal assistance using the least restrictive device within 7 day(s). 4.  Patient will ambulate with minimal assistance for 50 feet with the least restrictive device within 7 day(s). Outcome: Not Progressing     PHYSICAL THERAPY EVALUATION    Patient: Bahman Garcia (89 y.o. male)  Date: 7/31/2023  Primary Diagnosis: Cardiac arrest [facility]) [I46.9]       Precautions: Fall Risk                    ASSESSMENT :   The patient presents with impaired functional independence s/p admission secondary to cardiac arrest f/b intubation, extubated 07/30/23, now on 6L midflow [place]. Per neurology review brain MRI shows  \"small embolic appearing strokes seen in bilateral occipital, right parietal, left cerebellar lobes, radiology read pending. \"    Currently pt's functional mobility is limited by decreased balance, strength, AROM, activity tolerance, cognition, command following, attention/ concentration, fine motor control and coordination. Pt is [place] at baseline, has hearing aides with him. Received pt semi-fowlers in bed leaning left and his wife in the room.  Pt is calm, cooperative, following some simple commands w/ delayed responses, able to state his name and
Problem: Safety - Medical Restraint  Goal: Remains free of injury from restraints (Restraint for Interference with Medical Device)  Description: INTERVENTIONS:  1. Determine that other, less restrictive measures have been tried or would not be effective before applying the restraint  2. Evaluate the patient's condition at the time of restraint application  3. Inform patient/family regarding the reason for restraint  4. Q2H: Monitor safety, psychosocial status, comfort, nutrition and hydration  Outcome: Progressing  Flowsheets (Taken 8/1/2023 1521 by Estevan Merida RN)  Remains free of injury from restraints (restraint for interference with medical device):   Determine that other, less restrictive measures have been tried or would not be effective before applying the restraint   Inform patient/family regarding the reason for restraint   Evaluate the patient's condition at the time of restraint application   Every 2 hours: Monitor safety, psychosocial status, comfort, nutrition and hydration     Problem: Skin/Tissue Integrity  Goal: Absence of new skin breakdown  Description: 1. Monitor for areas of redness and/or skin breakdown  2. Assess vascular access sites hourly  3. Every 4-6 hours minimum:  Change oxygen saturation probe site  4. Every 4-6 hours:  If on nasal continuous positive airway pressure, respiratory therapy assess nares and determine need for appliance change or resting period. Outcome: Progressing     Problem: Safety - Adult  Goal: Free from fall injury  Outcome: Progressing     Problem: Confusion  Goal: Confusion, delirium, dementia, or psychosis is improved or at baseline  Description: INTERVENTIONS:  1. Assess for possible contributors to thought disturbance, including medications, impaired vision or hearing, underlying metabolic abnormalities, dehydration, psychiatric diagnoses, and notify attending LIP  2. [place] high risk fall precautions, as indicated  3.  Provide frequent short
Speech LAnguage Pathology EVALUATION    Patient: Catina Ramos (47 y.o. male)  Date: 7/31/2023  Primary Diagnosis: Cardiac arrest [facility]) [I46.9]       Precautions: aspiration, Fall Risk                  ASSESSMENT :  Based on the objective data described below, the patient presents with oropharyngeal dysphagia and suspect severe cognitive communication deficits. Would benefit from further cognitive communication evaluation on another date. Patient presented to ED post cardiac arrest where he was found down, received CPR in the field and was suspected to be down for 20 minutes. Patient intubated for 3 days and extubated less than 24 hours ago. MRI indicates severe chronic microvascular ischemic changes. On this date, patient alert, but highly confused. Oriented to self and could state name. Unable to answer any other simple questions accurately. Command following highly inconsistent and less than 50% accurate for simple commands. Patient with L lean in bed and with consistent cough in the absence of p.o. No dried oral secretions noted, but patient with very congested cough. Observed with thin liquids, ice chip, and puree. Patient with immediate cough post thin liquids, ice chip presentations. He completed multiple swallows with both presentations. With puree, patient again with 5 swallows for scant amount of puree presentation, but no overt s/s aspiration. Patient with difficulty accepting spoon, biting spoon. Able to suck straw, but also bit down on straw, which had to be removed by SLP. Given findings, will aim to complete instrumental assessment of swallow with FEES - fiberoptic endoscopic evaluation of the swallow later this date to best make diet recommendations for patient. Patient will benefit from skilled intervention to address the above impairments.      PLAN :  Recommendations and Planned Interventions:  Diet: NPO  --hold meds until FEES is completed  --FEES to assess swallow
condition     IDIOPATHIC HIVES    Recurrent left inguinal hernia 2/10/2013    repaired 2/21/13    Recurrent right inguinal hernia 8/20/2014    Snoring     Squamous cell cancer of scalp and skin of neck 01/2019    Stroke ([address]) 10/2014    POSSIBLE TIA; MRI DONE TODAY; Vertigo     Vitiligo      Past Surgical History:   Procedure Laterality Date    CHOLECYSTECTOMY  june 1982     Dr. Vasyl Wilburn  12-28-10    Left inguinal indirect hernia and lipoma of cord-Dr. Johanne Toussaint    HERNIA REPAIR  2-21-13    repair of recurrent left inguinal [id number]-TwDr. Cesar Aguayo    HERNIA REPAIR  12-30-14    Excision of suture granuloma right inguinal region and repair of recurrent direct inguinal hernia with P&P-[facility]-DR. Cesar Aguayo    HERNIA REPAIR  11-    Dr. Harris Arch    MALIGNANT [place]  01/2019    squamous cell - back of neck - [facility] - Dr. Miguel A Travis W/PIN [address]  12-28-10    Dr. Eb Esteban. Ayde Muse HERNIA,5+Y/O,REDUCIBL  12-    Dr. Cesar Aguayo     Prior Level of Function/Home Situation:   Social/Functional History  Lives With: Spouse  Type of Home: House  Home Layout: One level  Home Access: Stairs to enter without rails  Entrance Stairs - Number of Steps: 4  Bathroom Shower/Tub: Tub/Shower unit  Bathroom Equipment: None  Home Equipment: None  Has the patient had two or more falls in the past year or any fall with injury in the past year?: No  Receives Help From: Other (comment) (did not require assistance; has supportive family available to assist if needed)  ADL Assistance: Independent  Homemaking Assistance: Independent  Ambulation Assistance: Independent (no device)  Transfer Assistance: Independent  Active : Yes  Mode of Transportation: Car  Occupation: Retired  Type of Occupation: retired doctor
(overall max A for seated balance)  Sitting - Static: Poor (constant support)  Sitting - Dynamic: Poor (constant support)   Ambulation/Gait Training:  unable     Neuro Re-Education,Therapeutic Activity, Exercises:  LLE exs in bed: ankle pump, SAQ (tactile and verbal cues to facilitate movement). Pt did not respond to cues for RLE. Focus of session was on EOB sitting balance, working to increase pt contribution. Provided verbal and tactile cues, weight shift. Pt did nto respond to cues provided requiring Max A x1-2 to maintain EOB sitting.     Assisted with gown and rhiannon change (wet, condom cath leaked, RN aware)     Activity Tolerance:   Poor and requires rest breaks, increased O2 requirement    After treatment:   Patient left in no apparent distress in bed, Call bell within reach, Caregiver / family present, Side rails x3, and RN aware      COMMUNICATION/EDUCATION:   The patient's plan of care was discussed with: occupational therapist and registered nurse    Gabbi Harris, PT  Minutes: 97
Patient will tolerate least restrictive diet + thin liquids with no overt s/s aspiration within  7 days.   Outcome: Progressing
and repair of recurrent direct inguinal hernia with P&P-[facility]-DR. Lucas Obando    HERNIA REPAIR  11-    Dr. Elana Garcia    MALIGNANT [place]  01/2019    squamous cell - back of neck - [facility] - Dr. Wilton Amaya W/PIN [address]  12-28-10    Dr. Candy Dunn. Babita Bee HERNIA,5+Y/O,REDUCIBL  12-    Dr. Lucas Obando          Expanded or extensive additional review of patient history:   Lives With: Spouse  Type of Home: House  Home Layout: One level  Home Access: Stairs to enter without rails        Bathroom Shower/Tub: Tub/Shower unit     Bathroom Equipment: None     Home Equipment: None  Has the patient had two or more falls in the past year or any fall with injury in the past year?: No        Hand Dominance: right     EXAMINATION OF PERFORMANCE DEFICITS:    Cognitive/Behavioral Status:  Orientation  Overall Orientation Status: Impaired  Orientation Level: Oriented to person  Cognition  Overall Cognitive Status: Exceptions  Arousal/Alertness: Inconsistent responses to stimuli;Delayed responses to stimuli  Following Commands: Follows one step commands with increased time; Inconsistently follows commands  Problem Solving: Decreased awareness of errors;Assistance required to correct errors made;Assistance required to implement solutions  Insights: Decreased awareness of deficits  Initiation: Requires cues for all  Sequencing: Requires cues for all    Skin: scabbing on forehead, bruising around right ankle    Edema: slight LE swelling    Hearing:   Hearing  Hearing: Exceptions to [facility]  Hearing Exceptions: Bilateral hearing aid (hearing aides are dead)    Vision/Perceptual:    Vision - Basic Assessment  Prior Vision: Wears glasses all the time  Oculo Motor Control: Impaired  Impairments: Tracking;Smooth Pursuits     Vision  Vision: Impaired  Vision Exceptions: Wears glasses at all
